# Patient Record
Sex: MALE | Race: WHITE | Employment: UNEMPLOYED | URBAN - METROPOLITAN AREA
[De-identification: names, ages, dates, MRNs, and addresses within clinical notes are randomized per-mention and may not be internally consistent; named-entity substitution may affect disease eponyms.]

---

## 2017-06-06 ENCOUNTER — TELEPHONE (OUTPATIENT)
Dept: PHARMACY | Facility: CLINIC | Age: 82
End: 2017-06-06

## 2018-10-10 ENCOUNTER — OFFICE VISIT (OUTPATIENT)
Dept: VASCULAR SURGERY | Age: 83
End: 2018-10-10
Payer: MEDICARE

## 2018-10-10 DIAGNOSIS — B35.9 DERMATOPHYTOSIS: ICD-10-CM

## 2018-10-10 DIAGNOSIS — I87.8 VENOUS STASIS OF BOTH LOWER EXTREMITIES: ICD-10-CM

## 2018-10-10 DIAGNOSIS — L97.921 ULCERS OF BOTH LOWER LEGS, LIMITED TO BREAKDOWN OF SKIN (HCC): ICD-10-CM

## 2018-10-10 DIAGNOSIS — I70.242 ATHEROSCLEROSIS OF NATIVE ARTERY OF BOTH LOWER EXTREMITIES WITH BILATERAL ULCERATION OF CALVES (HCC): Primary | ICD-10-CM

## 2018-10-10 DIAGNOSIS — L97.911 ULCERS OF BOTH LOWER LEGS, LIMITED TO BREAKDOWN OF SKIN (HCC): ICD-10-CM

## 2018-10-10 DIAGNOSIS — I70.232 ATHEROSCLEROSIS OF NATIVE ARTERY OF BOTH LOWER EXTREMITIES WITH BILATERAL ULCERATION OF CALVES (HCC): Primary | ICD-10-CM

## 2018-10-10 PROBLEM — I70.249 ATHEROSCLEROSIS OF NATIVE ARTERY OF BOTH LOWER EXTREMITIES WITH BILATERAL ULCERATION (HCC): Status: ACTIVE | Noted: 2018-10-10

## 2018-10-10 PROBLEM — I70.239 ATHEROSCLEROSIS OF NATIVE ARTERY OF BOTH LOWER EXTREMITIES WITH BILATERAL ULCERATION (HCC): Status: ACTIVE | Noted: 2018-10-10

## 2018-10-10 PROCEDURE — 99215 OFFICE O/P EST HI 40 MIN: CPT | Performed by: SURGERY

## 2018-10-10 RX ORDER — TERBINAFINE HYDROCHLORIDE 250 MG/1
250 TABLET ORAL DAILY
Qty: 15 TABLET | Refills: 0 | Status: SHIPPED | OUTPATIENT
Start: 2018-10-10 | End: 2018-10-22

## 2018-10-10 NOTE — PROGRESS NOTES
both lower legs, limited to breakdown of skin ([address]    3. Dermatophytosis    4. Venous stasis of both lower extremities              Plan:        I had a long and detailed discussion the patient, all options, risks benefits and alternatives were explained, patient was recommended lower extremity arterial Doppler study for documentation of vascular status, and medications as outlined below and benefits follow him and the wound care center at [facility]      1. Lower extremity arterial Doppler study     2. Topical Lotrimin cream to be applied twice a day from the toes, in between the toes up to the knee and then dressed the legs with Xeroform gauze on the open ulcerated area wrap the legs from the foot to the knee with Kerlix and apply single Tubigrip pending arterial Doppler study results    3. Lamisil 2 times daily for 15 days      Patient was instructed to continue walking program and to call if any worsening of symptoms and to call if any focal lateralizing neurological symptoms like loss of speech, vision or loss of function of extremity. All the questions were answered.           Indicated follow-up: Follow-up in the wound care center, [facility], and when necessary

## 2018-10-12 NOTE — TELEPHONE ENCOUNTER
----- Message from Laila Boone MD sent at 10/12/2018  2:10 PM EDT -----  Please notify [facility], do at least 3 times per week for now and call me if any worsening

## 2018-10-22 ENCOUNTER — HOSPITAL ENCOUNTER (OUTPATIENT)
Age: 83
Discharge: HOME | End: 2018-10-22
Payer: MEDICARE

## 2018-10-22 ENCOUNTER — HOSPITAL ENCOUNTER (OUTPATIENT)
Dept: INTERVENTIONAL RADIOLOGY/VASCULAR | Age: 83
Discharge: HOME | End: 2018-10-24
Payer: MEDICARE

## 2018-10-22 ENCOUNTER — HOSPITAL ENCOUNTER (OUTPATIENT)
Dept: WOUND CARE | Age: 83
Discharge: HOME | End: 2018-10-22
Payer: MEDICARE

## 2018-10-22 VITALS
BODY MASS INDEX: 28.77 KG/M2 | HEIGHT: 66 IN | SYSTOLIC BLOOD PRESSURE: 140 MMHG | DIASTOLIC BLOOD PRESSURE: 80 MMHG | TEMPERATURE: 98.1 F | RESPIRATION RATE: 18 BRPM | HEART RATE: 68 BPM | WEIGHT: 179 LBS

## 2018-10-22 DIAGNOSIS — I70.242 ATHEROSCLEROSIS OF NATIVE ARTERY OF BOTH LOWER EXTREMITIES WITH BILATERAL ULCERATION OF CALVES (HCC): ICD-10-CM

## 2018-10-22 DIAGNOSIS — L97.911 ULCERS OF BOTH LOWER LEGS, LIMITED TO BREAKDOWN OF SKIN (HCC): ICD-10-CM

## 2018-10-22 DIAGNOSIS — L97.922 BILATERAL LEG ULCER, WITH FAT LAYER EXPOSED (HCC): ICD-10-CM

## 2018-10-22 DIAGNOSIS — L97.921 ULCERS OF BOTH LOWER LEGS, LIMITED TO BREAKDOWN OF SKIN (HCC): ICD-10-CM

## 2018-10-22 DIAGNOSIS — I70.232 ATHEROSCLEROSIS OF NATIVE ARTERY OF BOTH LOWER EXTREMITIES WITH BILATERAL ULCERATION OF CALVES (HCC): ICD-10-CM

## 2018-10-22 DIAGNOSIS — B35.9 DERMATOPHYTOSIS: ICD-10-CM

## 2018-10-22 DIAGNOSIS — L97.912 BILATERAL LEG ULCER, WITH FAT LAYER EXPOSED (HCC): ICD-10-CM

## 2018-10-22 DIAGNOSIS — I70.242 ATHEROSCLEROSIS OF NATIVE ARTERY OF BOTH LOWER EXTREMITIES WITH BILATERAL ULCERATION OF CALVES (HCC): Primary | ICD-10-CM

## 2018-10-22 DIAGNOSIS — I70.232 ATHEROSCLEROSIS OF NATIVE ARTERY OF BOTH LOWER EXTREMITIES WITH BILATERAL ULCERATION OF CALVES (HCC): Primary | ICD-10-CM

## 2018-10-22 DIAGNOSIS — E11.622 DIABETES WITH ULCER OF LOWER EXTREMITY (HCC): ICD-10-CM

## 2018-10-22 DIAGNOSIS — L97.909 DIABETES WITH ULCER OF LOWER EXTREMITY (HCC): ICD-10-CM

## 2018-10-22 DIAGNOSIS — I87.8 VENOUS STASIS OF BOTH LOWER EXTREMITIES: ICD-10-CM

## 2018-10-22 LAB
ALBUMIN SERPL-MCNC: 3.9 G/DL (ref 3.5–5.2)
ALP BLD-CCNC: 105 U/L (ref 40–129)
ALT SERPL-CCNC: 12 U/L (ref 0–40)
ANION GAP SERPL CALCULATED.3IONS-SCNC: 14 MMOL/L (ref 7–16)
AST SERPL-CCNC: 14 U/L (ref 0–39)
BASOPHILS ABSOLUTE: 0 E9/L (ref 0–0.2)
BASOPHILS RELATIVE PERCENT: 0.4 % (ref 0–2)
BILIRUB SERPL-MCNC: 0.4 MG/DL (ref 0–1.2)
BUN BLDV-MCNC: 33 MG/DL (ref 8–23)
BURR CELLS: ABNORMAL
CALCIUM SERPL-MCNC: 9.9 MG/DL (ref 8.6–10.2)
CHLORIDE BLD-SCNC: 107 MMOL/L (ref 98–107)
CO2: 26 MMOL/L (ref 22–29)
CREAT SERPL-MCNC: 1.9 MG/DL (ref 0.7–1.2)
EOSINOPHILS ABSOLUTE: 0.34 E9/L (ref 0.05–0.5)
EOSINOPHILS RELATIVE PERCENT: 4.3 % (ref 0–6)
GFR AFRICAN AMERICAN: 40
GFR NON-AFRICAN AMERICAN: 33 ML/MIN/1.73
GLUCOSE BLD-MCNC: 110 MG/DL (ref 74–109)
HCT VFR BLD CALC: 40.8 % (ref 37–54)
HEMOGLOBIN: 12.7 G/DL (ref 12.5–16.5)
LYMPHOCYTES ABSOLUTE: 1.68 E9/L (ref 1.5–4)
LYMPHOCYTES RELATIVE PERCENT: 20.9 % (ref 20–42)
MCH RBC QN AUTO: 28.7 PG (ref 26–35)
MCHC RBC AUTO-ENTMCNC: 31.1 % (ref 32–34.5)
MCV RBC AUTO: 92.3 FL (ref 80–99.9)
MONOCYTES ABSOLUTE: 0.56 E9/L (ref 0.1–0.95)
MONOCYTES RELATIVE PERCENT: 7 % (ref 2–12)
NEUTROPHILS ABSOLUTE: 5.44 E9/L (ref 1.8–7.3)
NEUTROPHILS RELATIVE PERCENT: 67.8 % (ref 43–80)
OVALOCYTES: ABNORMAL
PDW BLD-RTO: 14.2 FL (ref 11.5–15)
PLATELET # BLD: 55 E9/L (ref 130–450)
PLATELET CONFIRMATION: NORMAL
PMV BLD AUTO: 14.2 FL (ref 7–12)
POIKILOCYTES: ABNORMAL
POTASSIUM SERPL-SCNC: 4.7 MMOL/L (ref 3.5–5)
RBC # BLD: 4.42 E12/L (ref 3.8–5.8)
SODIUM BLD-SCNC: 147 MMOL/L (ref 132–146)
TOTAL PROTEIN: 8 G/DL (ref 6.4–8.3)
WBC # BLD: 8 E9/L (ref 4.5–11.5)

## 2018-10-22 PROCEDURE — 11045 DBRDMT SUBQ TISS EACH ADDL: CPT

## 2018-10-22 PROCEDURE — 80053 COMPREHEN METABOLIC PANEL: CPT

## 2018-10-22 PROCEDURE — 99214 OFFICE O/P EST MOD 30 MIN: CPT

## 2018-10-22 PROCEDURE — 85025 COMPLETE CBC W/AUTO DIFF WBC: CPT

## 2018-10-22 PROCEDURE — 36415 COLL VENOUS BLD VENIPUNCTURE: CPT

## 2018-10-22 PROCEDURE — 11042 DBRDMT SUBQ TIS 1ST 20SQCM/<: CPT

## 2018-10-22 PROCEDURE — 93923 UPR/LXTR ART STDY 3+ LVLS: CPT

## 2018-10-22 RX ORDER — ACETAMINOPHEN 325 MG/1
650 TABLET ORAL EVERY 6 HOURS PRN
COMMUNITY

## 2018-10-22 RX ORDER — GUAIFENESIN AND DEXTROMETHORPHAN HYDROBROMIDE 100; 10 MG/5ML; MG/5ML
5 SOLUTION ORAL EVERY 6 HOURS PRN
COMMUNITY

## 2018-10-22 RX ORDER — TERBINAFINE HYDROCHLORIDE 250 MG/1
250 TABLET ORAL DAILY
Qty: 15 TABLET | Refills: 0 | Status: ON HOLD | OUTPATIENT
Start: 2018-10-22 | End: 2020-01-01

## 2018-10-22 RX ORDER — CILOSTAZOL 100 MG/1
100 TABLET ORAL 2 TIMES DAILY
COMMUNITY

## 2018-10-22 RX ORDER — LANOLIN ALCOHOL/MO/W.PET/CERES
1 CREAM (GRAM) TOPICAL PRN
Status: ON HOLD | COMMUNITY
End: 2020-01-01

## 2018-10-22 NOTE — PROGRESS NOTES
Wound Healing Center /Hyperbarics   History and Physical/Consultation  Vascular    Referring Physician : Joel Rogers MD  [facility] RECORD NUMBER:  [id number]  AGE: 80 y.o. GENDER: male  : 1927  EPISODE DATE:  10/22/2018  Subjective:     Chief Complaint   Patient presents with    Wound Check     patient here for treatment of bilateral lower leg and dorsal left foot ulcers         HISTORY of PRESENT ILLNESS HPI     Marika Smyth is a 80 y.o. male who presents today for wound/ulcer evaluation. History of Wound Context:  The patient has had a wound of Both lower extremities, below the knee including the feet and the casts which was first noted approximately at least April of this year. This has been treated with dressing changes by the home health care at the assisted living home. On their initial visit to the wound healing center, 10/22/18, the patient has noted that the wound has not been improving. The patient has had similar previous wounds in the past.      Pt is currently not on abx.       Wound/Ulcer Pain Timing/Severity: constant  Quality of pain: sharp, dull  Severity:  2 / 10   Modifying Factors: Pain worsens with walking  Associated Signs/Symptoms: edema    Ulcer Identification:  Ulcer Type: arterial and diabetic  Contributing Factors: edema    Diabetic/Pressure/Non Pressure Ulcers only:  Ulcer: Diabetic ulcer, fat layer exposed    If patient has diabetic lower extremity wounds  Bender Classification of diabetic lower extremity wounds:    Grade Description   []  0 No open wound   [x]  1 Superficial ulcer involving the full skin thickness   []  2 Deep ulcer involves ligament, tendon, joint capsule, or fascia  No bone involvement or abscess presence   []  3 Deep Ulcer with abcess formation and/or osteomyelitis   []  4 Localized gangrene   []  5 Extensive gangrene of the foot     Wound: Multiple superficial ulcers, with adipose tissue, aggravated by underlying diabetes, tablet Take 6.25 mg by mouth daily       LISINOPRIL PO Take 20 mg by mouth 2 times daily. No current facility-administered medications on file prior to encounter.         REVIEW OF SYSTEMS   ROS : All others Negative if blank [], Positive if [x]  General Urinary   [] Fevers [] Hematuria   [] Chills [] Dysuria   [] Weight Loss Vascular   Skin [x] Claudication   [x] Tissue Loss [] Rest Pain   Eyes Neurologic   [] Wears Glasses/Contacts [] Stroke/TIA   [] Vision Changes [] Focal weakness   Respiratory [] Slurred Speech    [] Shortness of breath ENT   Cardiovascular [] Difficulty swallowing   [] Chest Pain Gastrointestinal   [] Shortness of breath with exertion [] Abdominal Pain    [] Melena      Diabetes mellitus, 2                Objective:    BP (!) 140/80   Pulse 68   Temp 98.1 °F (36.7 °C) (Oral)   Resp 18   Ht 5' 6\" (1.676 m)   Wt 179 lb (81.2 kg)   BMI 28.89 kg/m²   Wt Readings from Last 3 Encounters:   10/22/18 179 lb (81.2 kg)   10/10/16 172 lb (78 kg)   12/02/12 163 lb 6.4 oz (74.1 kg)       PHYSICAL EXAM  CONSTITUTIONAL:   Awake, alert, cooperative   PSYCHIATRIC :  Oriented to time, place and person      normal insight to disease process  ENT:  External ears and nose without lesions    Hearing deficits is not noted  NECK: Supple, symmetrical, trachea midline    Thyroid goiter not appreciated    Carotid bruit is not noted bilaterally  LUNGS:  No increased work of breathing                  Clear to auscultation bilaterally   CARDIOVASCULAR:  regular rate and rhythm   ABDOMEN:  soft, non-distended, non-tender    Hernias is not noted   Aorta is not palpable   Lymphatics : Cervical lymphadenopathy is not noted     Femoral lymphadenopathy is not noted  SKIN:   Skin color is normal   Texture and turgor is  normal   Induration is not noted  EXTREMITIES:   R UE Edema is not noted  L UE Edema is not noted  R LE Edema is  noted  L LE Edema is  Noted    Multiple ulcers noted both legs, with adipose tissue WITH AQUAPHOR PRIOR TO EACH DRESSING CHANGE. LAMISIL TO BE TAKEN DAILY FOR 15 DAYS AS ORDERED    LOTRIMINNE TO BE APPLIED TO BOTH FEET AND IN BETWEEN TOES AND TO LEGS AROUND WOUNDS. TWICE DAILY FOR ONE MONTH.    CBC AND CMP ORDERED    CASE SUMAN Lipscomb    [address] [place] [address],[address] followup visit ___1 WEEK__________________________  (Please note your next appointment above and if you are unable to keep, kindly give a 24 hour notice. Thank you.)    Physician signature:__________________________      If you experience any of the following, please call the [facility] during business hours:    * Increase in Pain  * Temperature over 101  * Increase in drainage from your wound  * Drainage with a foul odor  * Bleeding  * Increase in swelling  * Need for compression bandage changes due to slippage, breakthrough drainage. If you need medical attention outside of the business hours of the [facility] please contact your PCP or go to the nearest emergency room.         Electronically signed by Manda Prakash MD on 10/22/2018 at 11:15 AM

## 2018-10-29 ENCOUNTER — HOSPITAL ENCOUNTER (OUTPATIENT)
Dept: WOUND CARE | Age: 83
Discharge: HOME | End: 2018-10-29
Payer: MEDICARE

## 2018-10-29 VITALS
TEMPERATURE: 98.5 F | RESPIRATION RATE: 18 BRPM | HEIGHT: 66 IN | SYSTOLIC BLOOD PRESSURE: 142 MMHG | HEART RATE: 72 BPM | WEIGHT: 179 LBS | DIASTOLIC BLOOD PRESSURE: 68 MMHG | BODY MASS INDEX: 28.77 KG/M2

## 2018-10-29 DIAGNOSIS — L97.921 NON-PRESSURE CHRONIC ULCER OF LEFT LOWER LEG, LIMITED TO BREAKDOWN OF SKIN (HCC): ICD-10-CM

## 2018-10-29 PROCEDURE — 97598 DBRDMT OPN WND ADDL 20CM/<: CPT | Performed by: SURGERY

## 2018-10-29 PROCEDURE — 11042 DBRDMT SUBQ TIS 1ST 20SQCM/<: CPT

## 2018-10-29 PROCEDURE — 11045 DBRDMT SUBQ TISS EACH ADDL: CPT

## 2018-10-29 PROCEDURE — 97597 DBRDMT OPN WND 1ST 20 CM/<: CPT | Performed by: SURGERY

## 2018-10-29 NOTE — PLAN OF CARE
Problem: Wound:  Goal: Will show signs of wound healing; wound closure and no evidence of infection  Will show signs of wound healing; wound closure and no evidence of infection   Outcome: Ongoing      Problem: Arterial:  Goal: Optimize blood flow for wound healing  Optimize blood flow for wound healing   Outcome: Ongoing      Problem: Venous:  Goal: Signs of wound healing will improve  Signs of wound healing will improve   Outcome: Ongoing      Problem: Falls - Risk of:  Goal: Will remain free from falls  Will remain free from falls   Outcome: Ongoing      Problem: Blood Glucose:  Goal: Ability to maintain appropriate glucose levels will improve  Ability to maintain appropriate glucose levels will improve   Outcome: Ongoing

## 2018-10-29 NOTE — PROGRESS NOTES
Wound Healing Center Followup Visit Note    Referring Physician : Marley Horvath MD  [facility] RECORD NUMBER:  [id number]  AGE: 80 y.o. GENDER: male  : 1927  EPISODE DATE:  10/29/2018    Subjective:       Chief Complaint   Patient presents with    Wound Check       patient here for treatment of bilateral lower leg and dorsal left foot ulcers         HISTORY of PRESENT ILLNESS HPI      Kimber Yo is a 80 y.o. male who presents today for wound/ulcer evaluation. History of Wound Context:  The patient has had a wound of Both lower extremities, below the knee including the feet and the casts which was first noted approximately at least April of this year. This has been treated with dressing changes by the home health care at the assisted living home. On their initial visit to the wound healing center, 10/22/18, the patient has noted that the wound has not been improving. The patient has had similar previous wounds in the past.       Pt is currently not on abx.       Wound/Ulcer Pain Timing/Severity: constant  Quality of pain: sharp, dull  Severity:  2 / 10   Modifying Factors: Pain worsens with walking  Associated Signs/Symptoms: edema     Ulcer Identification:  Ulcer Type: arterial and diabetic  Contributing Factors: edema     Diabetic/Pressure/Non Pressure Ulcers only:  Ulcer: Diabetic ulcer, fat layer exposed     If patient has diabetic lower extremity wounds  Bender Classification of diabetic lower extremity wounds:     Grade Description   []  0 No open wound   [x]  1 Superficial ulcer involving the full skin thickness   []  2 Deep ulcer involves ligament, tendon, joint capsule, or fascia  No bone involvement or abscess presence   []  3 Deep Ulcer with abcess formation and/or osteomyelitis   []  4 Localized gangrene   []  5 Extensive gangrene of the foot      Wound: Multiple superficial ulcers, with adipose tissue, aggravated by underlying diabetes, peripheral vascular disease,

## 2018-11-05 ENCOUNTER — HOSPITAL ENCOUNTER (OUTPATIENT)
Dept: WOUND CARE | Age: 83
Discharge: HOME | End: 2018-11-05
Payer: MEDICARE

## 2019-01-01 ENCOUNTER — TELEPHONE (OUTPATIENT)
Dept: WOUND CARE | Age: 84
End: 2019-01-01

## 2019-11-06 NOTE — PROGRESS NOTES
Refugio Garner from [facility] called to say that the patient refuses to come to wound care. She said that his legs are pretty bad and that the patient is ok with just home care taking care of it. I called the referring office, Dr Marci Castano office, but it was closed. I could not leave a message.

## 2020-01-01 ENCOUNTER — APPOINTMENT (OUTPATIENT)
Dept: GENERAL RADIOLOGY | Age: 85
DRG: 682 | End: 2020-01-01
Payer: MEDICARE

## 2020-01-01 ENCOUNTER — APPOINTMENT (OUTPATIENT)
Dept: CT IMAGING | Age: 85
DRG: 682 | End: 2020-01-01
Payer: MEDICARE

## 2020-01-01 ENCOUNTER — HOSPITAL ENCOUNTER (INPATIENT)
Age: 85
LOS: 7 days | Discharge: ANOTHER HOSPITAL | DRG: 682 | End: 2020-06-01
Attending: EMERGENCY MEDICINE | Admitting: INTERNAL MEDICINE
Payer: MEDICARE

## 2020-01-01 VITALS
WEIGHT: 148.5 LBS | RESPIRATION RATE: 18 BRPM | BODY MASS INDEX: 23.87 KG/M2 | HEART RATE: 78 BPM | OXYGEN SATURATION: 95 % | SYSTOLIC BLOOD PRESSURE: 156 MMHG | TEMPERATURE: 97.6 F | DIASTOLIC BLOOD PRESSURE: 60 MMHG | HEIGHT: 66 IN

## 2020-01-01 LAB
ACETAMINOPHEN LEVEL: <5 MCG/ML (ref 10–30)
ALBUMIN SERPL-MCNC: 2.3 G/DL (ref 3.5–5.2)
ALBUMIN SERPL-MCNC: 2.9 G/DL (ref 3.5–5.2)
ALBUMIN SERPL-MCNC: 3.5 G/DL (ref 3.5–5.2)
ALP BLD-CCNC: 69 U/L (ref 40–129)
ALP BLD-CCNC: 71 U/L (ref 40–129)
ALP BLD-CCNC: 85 U/L (ref 40–129)
ALT SERPL-CCNC: 17 U/L (ref 0–40)
ALT SERPL-CCNC: 8 U/L (ref 0–40)
ALT SERPL-CCNC: 9 U/L (ref 0–40)
AMMONIA: 21 UMOL/L (ref 16–60)
AMPHETAMINE SCREEN, URINE: NOT DETECTED
ANION GAP SERPL CALCULATED.3IONS-SCNC: 11 MMOL/L (ref 7–16)
ANION GAP SERPL CALCULATED.3IONS-SCNC: 11 MMOL/L (ref 7–16)
ANION GAP SERPL CALCULATED.3IONS-SCNC: 12 MMOL/L (ref 7–16)
ANION GAP SERPL CALCULATED.3IONS-SCNC: 17 MMOL/L (ref 7–16)
ANION GAP SERPL CALCULATED.3IONS-SCNC: 19 MMOL/L (ref 7–16)
ANION GAP SERPL CALCULATED.3IONS-SCNC: 20 MMOL/L (ref 7–16)
ANION GAP SERPL CALCULATED.3IONS-SCNC: 9 MMOL/L (ref 7–16)
AST SERPL-CCNC: 14 U/L (ref 0–39)
AST SERPL-CCNC: 17 U/L (ref 0–39)
AST SERPL-CCNC: 22 U/L (ref 0–39)
BACTERIA: ABNORMAL /HPF
BARBITURATE SCREEN URINE: NOT DETECTED
BASOPHILS ABSOLUTE: 0.02 E9/L (ref 0–0.2)
BASOPHILS RELATIVE PERCENT: 0.3 % (ref 0–2)
BENZODIAZEPINE SCREEN, URINE: NOT DETECTED
BILIRUB SERPL-MCNC: 0.3 MG/DL (ref 0–1.2)
BILIRUB SERPL-MCNC: 0.4 MG/DL (ref 0–1.2)
BILIRUB SERPL-MCNC: 0.4 MG/DL (ref 0–1.2)
BILIRUBIN URINE: NEGATIVE
BLOOD, URINE: ABNORMAL
BUN BLDV-MCNC: 26 MG/DL (ref 8–23)
BUN BLDV-MCNC: 26 MG/DL (ref 8–23)
BUN BLDV-MCNC: 28 MG/DL (ref 8–23)
BUN BLDV-MCNC: 28 MG/DL (ref 8–23)
BUN BLDV-MCNC: 30 MG/DL (ref 8–23)
BUN BLDV-MCNC: 30 MG/DL (ref 8–23)
BUN BLDV-MCNC: 36 MG/DL (ref 8–23)
BUN BLDV-MCNC: 40 MG/DL (ref 8–23)
BUN BLDV-MCNC: 46 MG/DL (ref 8–23)
C-REACTIVE PROTEIN: 16.3 MG/DL (ref 0–0.4)
CALCIUM SERPL-MCNC: 8.3 MG/DL (ref 8.6–10.2)
CALCIUM SERPL-MCNC: 8.5 MG/DL (ref 8.6–10.2)
CALCIUM SERPL-MCNC: 8.5 MG/DL (ref 8.6–10.2)
CALCIUM SERPL-MCNC: 8.6 MG/DL (ref 8.6–10.2)
CALCIUM SERPL-MCNC: 8.7 MG/DL (ref 8.6–10.2)
CALCIUM SERPL-MCNC: 8.7 MG/DL (ref 8.6–10.2)
CALCIUM SERPL-MCNC: 8.8 MG/DL (ref 8.6–10.2)
CALCIUM SERPL-MCNC: 9.1 MG/DL (ref 8.6–10.2)
CALCIUM SERPL-MCNC: 9.1 MG/DL (ref 8.6–10.2)
CANNABINOID SCREEN URINE: NOT DETECTED
CHLORIDE BLD-SCNC: 100 MMOL/L (ref 98–107)
CHLORIDE BLD-SCNC: 105 MMOL/L (ref 98–107)
CHLORIDE BLD-SCNC: 108 MMOL/L (ref 98–107)
CHLORIDE BLD-SCNC: 109 MMOL/L (ref 98–107)
CHLORIDE BLD-SCNC: 115 MMOL/L (ref 98–107)
CHLORIDE BLD-SCNC: 116 MMOL/L (ref 98–107)
CHLORIDE BLD-SCNC: 116 MMOL/L (ref 98–107)
CHLORIDE BLD-SCNC: 118 MMOL/L (ref 98–107)
CHLORIDE BLD-SCNC: 98 MMOL/L (ref 98–107)
CHP ED QC CHECK: NORMAL
CK MB: 1.7 NG/ML (ref 0–7.7)
CK MB: 2.2 NG/ML (ref 0–7.7)
CLARITY: CLEAR
CO2: 19 MMOL/L (ref 22–29)
CO2: 19 MMOL/L (ref 22–29)
CO2: 21 MMOL/L (ref 22–29)
CO2: 22 MMOL/L (ref 22–29)
CO2: 23 MMOL/L (ref 22–29)
CO2: 23 MMOL/L (ref 22–29)
CO2: 24 MMOL/L (ref 22–29)
CO2: 24 MMOL/L (ref 22–29)
CO2: 27 MMOL/L (ref 22–29)
COCAINE METABOLITE SCREEN URINE: NOT DETECTED
COLOR: YELLOW
CREAT SERPL-MCNC: 1.4 MG/DL (ref 0.7–1.2)
CREAT SERPL-MCNC: 1.7 MG/DL (ref 0.7–1.2)
CREAT SERPL-MCNC: 1.7 MG/DL (ref 0.7–1.2)
CREAT SERPL-MCNC: 1.8 MG/DL (ref 0.7–1.2)
CREAT SERPL-MCNC: 1.8 MG/DL (ref 0.7–1.2)
CREAT SERPL-MCNC: 1.9 MG/DL (ref 0.7–1.2)
CREAT SERPL-MCNC: 2.3 MG/DL (ref 0.7–1.2)
EKG ATRIAL RATE: 85 BPM
EKG ATRIAL RATE: 90 BPM
EKG P AXIS: 49 DEGREES
EKG P AXIS: 59 DEGREES
EKG P-R INTERVAL: 154 MS
EKG P-R INTERVAL: 170 MS
EKG Q-T INTERVAL: 358 MS
EKG Q-T INTERVAL: 382 MS
EKG QRS DURATION: 72 MS
EKG QRS DURATION: 72 MS
EKG QTC CALCULATION (BAZETT): 437 MS
EKG QTC CALCULATION (BAZETT): 454 MS
EKG R AXIS: 13 DEGREES
EKG R AXIS: 59 DEGREES
EKG T AXIS: 30 DEGREES
EKG T AXIS: 73 DEGREES
EKG VENTRICULAR RATE: 85 BPM
EKG VENTRICULAR RATE: 90 BPM
EOSINOPHILS ABSOLUTE: 0.12 E9/L (ref 0.05–0.5)
EOSINOPHILS RELATIVE PERCENT: 1.5 % (ref 0–6)
ETHANOL: <10 MG/DL (ref 0–0.08)
FENTANYL SCREEN, URINE: NOT DETECTED
GFR AFRICAN AMERICAN: 32
GFR AFRICAN AMERICAN: 40
GFR AFRICAN AMERICAN: 43
GFR AFRICAN AMERICAN: 43
GFR AFRICAN AMERICAN: 46
GFR AFRICAN AMERICAN: 46
GFR AFRICAN AMERICAN: 57
GFR NON-AFRICAN AMERICAN: 27 ML/MIN/1.73
GFR NON-AFRICAN AMERICAN: 33 ML/MIN/1.73
GFR NON-AFRICAN AMERICAN: 35 ML/MIN/1.73
GFR NON-AFRICAN AMERICAN: 35 ML/MIN/1.73
GFR NON-AFRICAN AMERICAN: 38 ML/MIN/1.73
GFR NON-AFRICAN AMERICAN: 38 ML/MIN/1.73
GFR NON-AFRICAN AMERICAN: 47 ML/MIN/1.73
GLUCOSE BLD-MCNC: 103 MG/DL (ref 74–99)
GLUCOSE BLD-MCNC: 117 MG/DL (ref 74–99)
GLUCOSE BLD-MCNC: 119 MG/DL (ref 74–99)
GLUCOSE BLD-MCNC: 121 MG/DL (ref 74–99)
GLUCOSE BLD-MCNC: 125 MG/DL (ref 74–99)
GLUCOSE BLD-MCNC: 128 MG/DL (ref 74–99)
GLUCOSE BLD-MCNC: 147 MG/DL (ref 74–99)
GLUCOSE BLD-MCNC: 151 MG/DL (ref 74–99)
GLUCOSE BLD-MCNC: 170 MG/DL (ref 74–99)
GLUCOSE BLD-MCNC: 93 MG/DL
GLUCOSE URINE: NEGATIVE MG/DL
HBA1C MFR BLD: 5.9 % (ref 4–5.6)
HCT VFR BLD CALC: 31.8 % (ref 37–54)
HCT VFR BLD CALC: 32.2 % (ref 37–54)
HCT VFR BLD CALC: 35.1 % (ref 37–54)
HEMOGLOBIN: 10 G/DL (ref 12.5–16.5)
HEMOGLOBIN: 10.4 G/DL (ref 12.5–16.5)
HEMOGLOBIN: 11 G/DL (ref 12.5–16.5)
IMMATURE GRANULOCYTES #: 0.04 E9/L
IMMATURE GRANULOCYTES %: 0.5 % (ref 0–5)
INR BLD: 1.1
KETONES, URINE: NEGATIVE MG/DL
LACTIC ACID, SEPSIS: 1.3 MMOL/L (ref 0.5–1.9)
LACTIC ACID: 0.8 MMOL/L (ref 0.5–2.2)
LACTIC ACID: 0.8 MMOL/L (ref 0.5–2.2)
LACTIC ACID: 0.9 MMOL/L (ref 0.5–2.2)
LACTIC ACID: 1.2 MMOL/L (ref 0.5–2.2)
LACTIC ACID: 1.3 MMOL/L (ref 0.5–2.2)
LACTIC ACID: 1.4 MMOL/L (ref 0.5–2.2)
LEUKOCYTE ESTERASE, URINE: NEGATIVE
LYMPHOCYTES ABSOLUTE: 1.74 E9/L (ref 1.5–4)
LYMPHOCYTES RELATIVE PERCENT: 22.3 % (ref 20–42)
Lab: NORMAL
MAGNESIUM: 1.3 MG/DL (ref 1.6–2.6)
MCH RBC QN AUTO: 28.6 PG (ref 26–35)
MCH RBC QN AUTO: 28.7 PG (ref 26–35)
MCH RBC QN AUTO: 29.1 PG (ref 26–35)
MCHC RBC AUTO-ENTMCNC: 31.3 % (ref 32–34.5)
MCHC RBC AUTO-ENTMCNC: 31.4 % (ref 32–34.5)
MCHC RBC AUTO-ENTMCNC: 32.3 % (ref 32–34.5)
MCV RBC AUTO: 90.2 FL (ref 80–99.9)
MCV RBC AUTO: 91.1 FL (ref 80–99.9)
MCV RBC AUTO: 91.2 FL (ref 80–99.9)
METER GLUCOSE: 104 MG/DL (ref 74–99)
METER GLUCOSE: 110 MG/DL (ref 74–99)
METER GLUCOSE: 110 MG/DL (ref 74–99)
METER GLUCOSE: 113 MG/DL (ref 74–99)
METER GLUCOSE: 117 MG/DL (ref 74–99)
METER GLUCOSE: 120 MG/DL (ref 74–99)
METER GLUCOSE: 120 MG/DL (ref 74–99)
METER GLUCOSE: 121 MG/DL (ref 74–99)
METER GLUCOSE: 123 MG/DL (ref 74–99)
METER GLUCOSE: 124 MG/DL (ref 74–99)
METER GLUCOSE: 125 MG/DL (ref 74–99)
METER GLUCOSE: 130 MG/DL (ref 74–99)
METER GLUCOSE: 130 MG/DL (ref 74–99)
METER GLUCOSE: 131 MG/DL (ref 74–99)
METER GLUCOSE: 133 MG/DL (ref 74–99)
METER GLUCOSE: 134 MG/DL (ref 74–99)
METER GLUCOSE: 137 MG/DL (ref 74–99)
METER GLUCOSE: 138 MG/DL (ref 74–99)
METER GLUCOSE: 139 MG/DL (ref 74–99)
METER GLUCOSE: 141 MG/DL (ref 74–99)
METER GLUCOSE: 142 MG/DL (ref 74–99)
METER GLUCOSE: 145 MG/DL (ref 74–99)
METER GLUCOSE: 148 MG/DL (ref 74–99)
METER GLUCOSE: 156 MG/DL (ref 74–99)
METER GLUCOSE: 160 MG/DL (ref 74–99)
METER GLUCOSE: 163 MG/DL (ref 74–99)
METER GLUCOSE: 167 MG/DL (ref 74–99)
METER GLUCOSE: 168 MG/DL (ref 74–99)
METER GLUCOSE: 349 MG/DL (ref 74–99)
METER GLUCOSE: 93 MG/DL (ref 74–99)
METER GLUCOSE: 96 MG/DL (ref 74–99)
METHADONE SCREEN, URINE: NOT DETECTED
MONOCYTES ABSOLUTE: 0.43 E9/L (ref 0.1–0.95)
MONOCYTES RELATIVE PERCENT: 5.5 % (ref 2–12)
NEUTROPHILS ABSOLUTE: 5.46 E9/L (ref 1.8–7.3)
NEUTROPHILS RELATIVE PERCENT: 69.9 % (ref 43–80)
NITRITE, URINE: NEGATIVE
OPIATE SCREEN URINE: NOT DETECTED
OXYCODONE URINE: NOT DETECTED
PDW BLD-RTO: 15.4 FL (ref 11.5–15)
PDW BLD-RTO: 15.7 FL (ref 11.5–15)
PDW BLD-RTO: 16 FL (ref 11.5–15)
PH UA: 7 (ref 5–9)
PHENCYCLIDINE SCREEN URINE: NOT DETECTED
PLATELET # BLD: 141 E9/L (ref 130–450)
PLATELET # BLD: 153 E9/L (ref 130–450)
PLATELET # BLD: 164 E9/L (ref 130–450)
PMV BLD AUTO: 13 FL (ref 7–12)
PMV BLD AUTO: 13.9 FL (ref 7–12)
PMV BLD AUTO: ABNORMAL FL (ref 7–12)
POTASSIUM SERPL-SCNC: 2.9 MMOL/L (ref 3.5–5)
POTASSIUM SERPL-SCNC: 3.1 MMOL/L (ref 3.5–5)
POTASSIUM SERPL-SCNC: 3.3 MMOL/L (ref 3.5–5)
POTASSIUM SERPL-SCNC: 3.8 MMOL/L (ref 3.5–5)
POTASSIUM SERPL-SCNC: 3.9 MMOL/L (ref 3.5–5)
POTASSIUM SERPL-SCNC: 3.9 MMOL/L (ref 3.5–5)
POTASSIUM SERPL-SCNC: 4.2 MMOL/L (ref 3.5–5)
POTASSIUM SERPL-SCNC: 4.3 MMOL/L (ref 3.5–5)
POTASSIUM SERPL-SCNC: 5.9 MMOL/L (ref 3.5–5)
PROTEIN UA: 100 MG/DL
PROTHROMBIN TIME: 12.7 SEC (ref 9.3–12.4)
RBC # BLD: 3.49 E12/L (ref 3.8–5.8)
RBC # BLD: 3.57 E12/L (ref 3.8–5.8)
RBC # BLD: 3.85 E12/L (ref 3.8–5.8)
RBC UA: ABNORMAL /HPF (ref 0–2)
REASON FOR REJECTION: NORMAL
REJECTED TEST: NORMAL
SALICYLATE, SERUM: <0.3 MG/DL (ref 0–30)
SARS-COV-2, NAAT: NOT DETECTED
SARS-COV-2, NAAT: NOT DETECTED
SEDIMENTATION RATE, ERYTHROCYTE: 90 MM/HR (ref 0–15)
SODIUM BLD-SCNC: 137 MMOL/L (ref 132–146)
SODIUM BLD-SCNC: 142 MMOL/L (ref 132–146)
SODIUM BLD-SCNC: 144 MMOL/L (ref 132–146)
SODIUM BLD-SCNC: 146 MMOL/L (ref 132–146)
SODIUM BLD-SCNC: 146 MMOL/L (ref 132–146)
SODIUM BLD-SCNC: 148 MMOL/L (ref 132–146)
SODIUM BLD-SCNC: 149 MMOL/L (ref 132–146)
SPECIFIC GRAVITY UA: 1.01 (ref 1–1.03)
TOTAL CK: 126 U/L (ref 20–200)
TOTAL CK: 78 U/L (ref 20–200)
TOTAL CK: 91 U/L (ref 20–200)
TOTAL PROTEIN: 6 G/DL (ref 6.4–8.3)
TOTAL PROTEIN: 6.4 G/DL (ref 6.4–8.3)
TOTAL PROTEIN: 7.5 G/DL (ref 6.4–8.3)
TRICYCLIC ANTIDEPRESSANTS SCREEN SERUM: NEGATIVE NG/ML
TROPONIN: 0.02 NG/ML (ref 0–0.03)
TROPONIN: 0.03 NG/ML (ref 0–0.03)
TROPONIN: 0.03 NG/ML (ref 0–0.03)
TROPONIN: 0.04 NG/ML (ref 0–0.03)
TSH SERPL DL<=0.05 MIU/L-ACNC: 2.03 UIU/ML (ref 0.27–4.2)
UROBILINOGEN, URINE: 0.2 E.U./DL
WBC # BLD: 11.4 E9/L (ref 4.5–11.5)
WBC # BLD: 7.8 E9/L (ref 4.5–11.5)
WBC # BLD: 9.5 E9/L (ref 4.5–11.5)
WBC UA: ABNORMAL /HPF (ref 0–5)

## 2020-01-01 PROCEDURE — 2060000000 HC ICU INTERMEDIATE R&B

## 2020-01-01 PROCEDURE — 82962 GLUCOSE BLOOD TEST: CPT

## 2020-01-01 PROCEDURE — 2500000003 HC RX 250 WO HCPCS: Performed by: INTERNAL MEDICINE

## 2020-01-01 PROCEDURE — 6370000000 HC RX 637 (ALT 250 FOR IP): Performed by: INTERNAL MEDICINE

## 2020-01-01 PROCEDURE — 2580000003 HC RX 258: Performed by: INTERNAL MEDICINE

## 2020-01-01 PROCEDURE — 6360000002 HC RX W HCPCS: Performed by: INTERNAL MEDICINE

## 2020-01-01 PROCEDURE — 6370000000 HC RX 637 (ALT 250 FOR IP): Performed by: HOSPITALIST

## 2020-01-01 PROCEDURE — 70450 CT HEAD/BRAIN W/O DYE: CPT

## 2020-01-01 PROCEDURE — 6360000002 HC RX W HCPCS: Performed by: FAMILY MEDICINE

## 2020-01-01 PROCEDURE — 71045 X-RAY EXAM CHEST 1 VIEW: CPT

## 2020-01-01 PROCEDURE — 36415 COLL VENOUS BLD VENIPUNCTURE: CPT

## 2020-01-01 PROCEDURE — 83605 ASSAY OF LACTIC ACID: CPT

## 2020-01-01 PROCEDURE — 93005 ELECTROCARDIOGRAM TRACING: CPT | Performed by: FAMILY MEDICINE

## 2020-01-01 PROCEDURE — 74018 RADEX ABDOMEN 1 VIEW: CPT

## 2020-01-01 PROCEDURE — 84443 ASSAY THYROID STIM HORMONE: CPT

## 2020-01-01 PROCEDURE — 85027 COMPLETE CBC AUTOMATED: CPT

## 2020-01-01 PROCEDURE — G0480 DRUG TEST DEF 1-7 CLASSES: HCPCS

## 2020-01-01 PROCEDURE — 93005 ELECTROCARDIOGRAM TRACING: CPT | Performed by: EMERGENCY MEDICINE

## 2020-01-01 PROCEDURE — 80048 BASIC METABOLIC PNL TOTAL CA: CPT

## 2020-01-01 PROCEDURE — 83735 ASSAY OF MAGNESIUM: CPT

## 2020-01-01 PROCEDURE — 81001 URINALYSIS AUTO W/SCOPE: CPT

## 2020-01-01 PROCEDURE — 97535 SELF CARE MNGMENT TRAINING: CPT

## 2020-01-01 PROCEDURE — U0002 COVID-19 LAB TEST NON-CDC: HCPCS

## 2020-01-01 PROCEDURE — 93010 ELECTROCARDIOGRAM REPORT: CPT | Performed by: INTERNAL MEDICINE

## 2020-01-01 PROCEDURE — 85025 COMPLETE CBC W/AUTO DIFF WBC: CPT

## 2020-01-01 PROCEDURE — 85610 PROTHROMBIN TIME: CPT

## 2020-01-01 PROCEDURE — APPSS60 APP SPLIT SHARED TIME 46-60 MINUTES: Performed by: NURSE PRACTITIONER

## 2020-01-01 PROCEDURE — 97530 THERAPEUTIC ACTIVITIES: CPT

## 2020-01-01 PROCEDURE — 2500000003 HC RX 250 WO HCPCS: Performed by: PHYSICIAN ASSISTANT

## 2020-01-01 PROCEDURE — 1200000000 HC SEMI PRIVATE

## 2020-01-01 PROCEDURE — 80307 DRUG TEST PRSMV CHEM ANLYZR: CPT

## 2020-01-01 PROCEDURE — 72170 X-RAY EXAM OF PELVIS: CPT

## 2020-01-01 PROCEDURE — 85651 RBC SED RATE NONAUTOMATED: CPT

## 2020-01-01 PROCEDURE — 2500000003 HC RX 250 WO HCPCS: Performed by: SURGERY

## 2020-01-01 PROCEDURE — 84484 ASSAY OF TROPONIN QUANT: CPT

## 2020-01-01 PROCEDURE — 83036 HEMOGLOBIN GLYCOSYLATED A1C: CPT

## 2020-01-01 PROCEDURE — 97165 OT EVAL LOW COMPLEX 30 MIN: CPT

## 2020-01-01 PROCEDURE — 2500000003 HC RX 250 WO HCPCS: Performed by: FAMILY MEDICINE

## 2020-01-01 PROCEDURE — 74240 X-RAY XM UPR GI TRC 1CNTRST: CPT

## 2020-01-01 PROCEDURE — 72125 CT NECK SPINE W/O DYE: CPT

## 2020-01-01 PROCEDURE — 6360000002 HC RX W HCPCS: Performed by: NURSE PRACTITIONER

## 2020-01-01 PROCEDURE — 99221 1ST HOSP IP/OBS SF/LOW 40: CPT | Performed by: PSYCHIATRY & NEUROLOGY

## 2020-01-01 PROCEDURE — 80053 COMPREHEN METABOLIC PANEL: CPT

## 2020-01-01 PROCEDURE — 99285 EMERGENCY DEPT VISIT HI MDM: CPT

## 2020-01-01 PROCEDURE — 94761 N-INVAS EAR/PLS OXIMETRY MLT: CPT

## 2020-01-01 PROCEDURE — 82550 ASSAY OF CK (CPK): CPT

## 2020-01-01 PROCEDURE — G0378 HOSPITAL OBSERVATION PER HR: HCPCS

## 2020-01-01 PROCEDURE — 82553 CREATINE MB FRACTION: CPT

## 2020-01-01 PROCEDURE — 82140 ASSAY OF AMMONIA: CPT

## 2020-01-01 PROCEDURE — 97530 THERAPEUTIC ACTIVITIES: CPT | Performed by: PHYSICAL THERAPIST

## 2020-01-01 PROCEDURE — 6360000002 HC RX W HCPCS: Performed by: HOSPITALIST

## 2020-01-01 PROCEDURE — 86140 C-REACTIVE PROTEIN: CPT

## 2020-01-01 PROCEDURE — 99223 1ST HOSP IP/OBS HIGH 75: CPT | Performed by: INTERNAL MEDICINE

## 2020-01-01 PROCEDURE — 97161 PT EVAL LOW COMPLEX 20 MIN: CPT | Performed by: PHYSICAL THERAPIST

## 2020-01-01 RX ORDER — PANTOPRAZOLE SODIUM 40 MG/1
40 TABLET, DELAYED RELEASE ORAL DAILY
Status: DISCONTINUED | OUTPATIENT
Start: 2020-01-01 | End: 2020-01-01 | Stop reason: HOSPADM

## 2020-01-01 RX ORDER — MIRTAZAPINE 15 MG/1
15 TABLET, FILM COATED ORAL NIGHTLY
Status: DISCONTINUED | OUTPATIENT
Start: 2020-01-01 | End: 2020-01-01 | Stop reason: HOSPADM

## 2020-01-01 RX ORDER — ONDANSETRON 2 MG/ML
4 INJECTION INTRAMUSCULAR; INTRAVENOUS EVERY 6 HOURS PRN
Status: DISCONTINUED | OUTPATIENT
Start: 2020-01-01 | End: 2020-01-01 | Stop reason: HOSPADM

## 2020-01-01 RX ORDER — HYDRALAZINE HYDROCHLORIDE 20 MG/ML
10 INJECTION INTRAMUSCULAR; INTRAVENOUS EVERY 4 HOURS PRN
Status: DISCONTINUED | OUTPATIENT
Start: 2020-01-01 | End: 2020-01-01 | Stop reason: HOSPADM

## 2020-01-01 RX ORDER — M-VIT,TX,IRON,MINS/CALC/FOLIC 27MG-0.4MG
1 TABLET ORAL DAILY
Status: DISCONTINUED | OUTPATIENT
Start: 2020-01-01 | End: 2020-01-01

## 2020-01-01 RX ORDER — NICOTINE POLACRILEX 4 MG
15 LOZENGE BUCCAL PRN
Status: DISCONTINUED | OUTPATIENT
Start: 2020-01-01 | End: 2020-01-01 | Stop reason: HOSPADM

## 2020-01-01 RX ORDER — HEPARIN SODIUM 10000 [USP'U]/ML
5000 INJECTION, SOLUTION INTRAVENOUS; SUBCUTANEOUS EVERY 8 HOURS
Status: DISCONTINUED | OUTPATIENT
Start: 2020-01-01 | End: 2020-01-01 | Stop reason: HOSPADM

## 2020-01-01 RX ORDER — LORAZEPAM 2 MG/ML
0.5 INJECTION INTRAMUSCULAR ONCE
Status: DISCONTINUED | OUTPATIENT
Start: 2020-01-01 | End: 2020-01-01 | Stop reason: HOSPADM

## 2020-01-01 RX ORDER — AMLODIPINE BESYLATE 10 MG/1
10 TABLET ORAL DAILY
Qty: 30 TABLET | Refills: 3
Start: 2020-01-01

## 2020-01-01 RX ORDER — CILOSTAZOL 100 MG/1
100 TABLET ORAL 2 TIMES DAILY
Status: DISCONTINUED | OUTPATIENT
Start: 2020-01-01 | End: 2020-01-01 | Stop reason: HOSPADM

## 2020-01-01 RX ORDER — METOCLOPRAMIDE HYDROCHLORIDE 5 MG/ML
5 INJECTION INTRAMUSCULAR; INTRAVENOUS EVERY 6 HOURS
Status: DISCONTINUED | OUTPATIENT
Start: 2020-01-01 | End: 2020-01-01

## 2020-01-01 RX ORDER — METOPROLOL TARTRATE 5 MG/5ML
2.5 INJECTION INTRAVENOUS ONCE
Status: COMPLETED | OUTPATIENT
Start: 2020-01-01 | End: 2020-01-01

## 2020-01-01 RX ORDER — HYDRALAZINE HYDROCHLORIDE 20 MG/ML
5 INJECTION INTRAMUSCULAR; INTRAVENOUS EVERY 6 HOURS PRN
Status: DISCONTINUED | OUTPATIENT
Start: 2020-01-01 | End: 2020-01-01

## 2020-01-01 RX ORDER — PRAVASTATIN SODIUM 10 MG
10 TABLET ORAL DAILY
Status: DISCONTINUED | OUTPATIENT
Start: 2020-01-01 | End: 2020-01-01 | Stop reason: HOSPADM

## 2020-01-01 RX ORDER — POTASSIUM CHLORIDE, DEXTROSE MONOHYDRATE AND SODIUM CHLORIDE 300; 5; 900 MG/100ML; G/100ML; MG/100ML
INJECTION, SOLUTION INTRAVENOUS CONTINUOUS
Status: DISCONTINUED | OUTPATIENT
Start: 2020-01-01 | End: 2020-01-01 | Stop reason: SDUPTHER

## 2020-01-01 RX ORDER — DEXTROSE, SODIUM CHLORIDE, AND POTASSIUM CHLORIDE 5; .45; .3 G/100ML; G/100ML; G/100ML
INJECTION INTRAVENOUS CONTINUOUS
Status: DISCONTINUED | OUTPATIENT
Start: 2020-01-01 | End: 2020-01-01 | Stop reason: HOSPADM

## 2020-01-01 RX ORDER — DEXTROSE MONOHYDRATE 50 MG/ML
100 INJECTION, SOLUTION INTRAVENOUS PRN
Status: DISCONTINUED | OUTPATIENT
Start: 2020-01-01 | End: 2020-01-01 | Stop reason: HOSPADM

## 2020-01-01 RX ORDER — DOCUSATE SODIUM 100 MG/1
100 CAPSULE, LIQUID FILLED ORAL NIGHTLY
Status: DISCONTINUED | OUTPATIENT
Start: 2020-01-01 | End: 2020-01-01 | Stop reason: HOSPADM

## 2020-01-01 RX ORDER — HYDRALAZINE HYDROCHLORIDE 20 MG/ML
20 INJECTION INTRAMUSCULAR; INTRAVENOUS ONCE
Status: COMPLETED | OUTPATIENT
Start: 2020-01-01 | End: 2020-01-01

## 2020-01-01 RX ORDER — DEXTROSE MONOHYDRATE 25 G/50ML
12.5 INJECTION, SOLUTION INTRAVENOUS PRN
Status: DISCONTINUED | OUTPATIENT
Start: 2020-01-01 | End: 2020-01-01 | Stop reason: HOSPADM

## 2020-01-01 RX ORDER — DEXTROSE MONOHYDRATE 50 MG/ML
INJECTION, SOLUTION INTRAVENOUS CONTINUOUS
Status: DISCONTINUED | OUTPATIENT
Start: 2020-01-01 | End: 2020-01-01

## 2020-01-01 RX ORDER — ACETAMINOPHEN 325 MG/1
650 TABLET ORAL EVERY 4 HOURS PRN
Status: DISCONTINUED | OUTPATIENT
Start: 2020-01-01 | End: 2020-01-01 | Stop reason: HOSPADM

## 2020-01-01 RX ORDER — POTASSIUM CHLORIDE 7.45 MG/ML
10 INJECTION INTRAVENOUS
Status: COMPLETED | OUTPATIENT
Start: 2020-01-01 | End: 2020-01-01

## 2020-01-01 RX ORDER — AMLODIPINE BESYLATE 10 MG/1
10 TABLET ORAL DAILY
Status: DISCONTINUED | OUTPATIENT
Start: 2020-01-01 | End: 2020-01-01 | Stop reason: HOSPADM

## 2020-01-01 RX ORDER — METOPROLOL TARTRATE 5 MG/5ML
2.5 INJECTION INTRAVENOUS EVERY 6 HOURS
Status: DISCONTINUED | OUTPATIENT
Start: 2020-01-01 | End: 2020-01-01

## 2020-01-01 RX ORDER — AMLODIPINE BESYLATE 5 MG/1
5 TABLET ORAL DAILY
Status: DISCONTINUED | OUTPATIENT
Start: 2020-01-01 | End: 2020-01-01

## 2020-01-01 RX ORDER — CARVEDILOL 6.25 MG/1
6.25 TABLET ORAL DAILY
Status: DISCONTINUED | OUTPATIENT
Start: 2020-01-01 | End: 2020-01-01 | Stop reason: HOSPADM

## 2020-01-01 RX ORDER — PETROLATUM 42 G/100G
OINTMENT TOPICAL 3 TIMES DAILY PRN
Status: DISCONTINUED | OUTPATIENT
Start: 2020-01-01 | End: 2020-01-01 | Stop reason: HOSPADM

## 2020-01-01 RX ORDER — PETROLATUM 42 G/100G
OINTMENT TOPICAL PRN
Status: DISCONTINUED | OUTPATIENT
Start: 2020-01-01 | End: 2020-01-01

## 2020-01-01 RX ADMIN — ONDANSETRON 4 MG: 2 INJECTION INTRAMUSCULAR; INTRAVENOUS at 12:25

## 2020-01-01 RX ADMIN — POTASSIUM BICARBONATE 40 MEQ: 782 TABLET, EFFERVESCENT ORAL at 21:04

## 2020-01-01 RX ADMIN — METOPROLOL TARTRATE 2.5 MG: 5 INJECTION INTRAVENOUS at 17:36

## 2020-01-01 RX ADMIN — CILOSTAZOL 100 MG: 100 TABLET ORAL at 22:50

## 2020-01-01 RX ADMIN — Medication: at 21:00

## 2020-01-01 RX ADMIN — POTASSIUM CHLORIDE 10 MEQ: 7.46 INJECTION, SOLUTION INTRAVENOUS at 13:25

## 2020-01-01 RX ADMIN — HEPARIN SODIUM 5000 UNITS: 10000 INJECTION INTRAVENOUS; SUBCUTANEOUS at 22:42

## 2020-01-01 RX ADMIN — HYDRALAZINE HYDROCHLORIDE 5 MG: 20 INJECTION INTRAMUSCULAR; INTRAVENOUS at 15:18

## 2020-01-01 RX ADMIN — HEPARIN SODIUM 5000 UNITS: 10000 INJECTION INTRAVENOUS; SUBCUTANEOUS at 14:50

## 2020-01-01 RX ADMIN — HEPARIN SODIUM 5000 UNITS: 10000 INJECTION INTRAVENOUS; SUBCUTANEOUS at 21:03

## 2020-01-01 RX ADMIN — BARIUM SULFATE 176 G: 960 POWDER, FOR SUSPENSION ORAL at 14:30

## 2020-01-01 RX ADMIN — METOCLOPRAMIDE 5 MG: 5 INJECTION, SOLUTION INTRAMUSCULAR; INTRAVENOUS at 08:17

## 2020-01-01 RX ADMIN — Medication: at 21:58

## 2020-01-01 RX ADMIN — PRAVASTATIN SODIUM 10 MG: 10 TABLET ORAL at 09:44

## 2020-01-01 RX ADMIN — DOCUSATE SODIUM 100 MG: 100 CAPSULE, LIQUID FILLED ORAL at 21:07

## 2020-01-01 RX ADMIN — Medication: at 21:47

## 2020-01-01 RX ADMIN — ONDANSETRON 4 MG: 2 INJECTION INTRAMUSCULAR; INTRAVENOUS at 06:29

## 2020-01-01 RX ADMIN — CILOSTAZOL 100 MG: 100 TABLET ORAL at 08:28

## 2020-01-01 RX ADMIN — HEPARIN SODIUM 5000 UNITS: 10000 INJECTION INTRAVENOUS; SUBCUTANEOUS at 13:35

## 2020-01-01 RX ADMIN — DOCUSATE SODIUM 100 MG: 100 CAPSULE, LIQUID FILLED ORAL at 21:00

## 2020-01-01 RX ADMIN — HEPARIN SODIUM 5000 UNITS: 10000 INJECTION INTRAVENOUS; SUBCUTANEOUS at 17:36

## 2020-01-01 RX ADMIN — METOPROLOL TARTRATE 2.5 MG: 5 INJECTION INTRAVENOUS at 21:54

## 2020-01-01 RX ADMIN — Medication: at 09:32

## 2020-01-01 RX ADMIN — CILOSTAZOL 100 MG: 100 TABLET ORAL at 21:07

## 2020-01-01 RX ADMIN — DEXTROSE MONOHYDRATE: 50 INJECTION, SOLUTION INTRAVENOUS at 18:03

## 2020-01-01 RX ADMIN — HYDRALAZINE HYDROCHLORIDE 10 MG: 20 INJECTION INTRAMUSCULAR; INTRAVENOUS at 10:06

## 2020-01-01 RX ADMIN — SKIN PROTECTANT: 44 OINTMENT TOPICAL at 09:32

## 2020-01-01 RX ADMIN — ONDANSETRON 4 MG: 2 INJECTION INTRAMUSCULAR; INTRAVENOUS at 00:05

## 2020-01-01 RX ADMIN — HEPARIN SODIUM: 10000 INJECTION INTRAVENOUS; SUBCUTANEOUS at 06:05

## 2020-01-01 RX ADMIN — DEXTROSE MONOHYDRATE: 50 INJECTION, SOLUTION INTRAVENOUS at 22:06

## 2020-01-01 RX ADMIN — ONDANSETRON 4 MG: 2 INJECTION INTRAMUSCULAR; INTRAVENOUS at 17:13

## 2020-01-01 RX ADMIN — HEPARIN SODIUM 5000 UNITS: 10000 INJECTION INTRAVENOUS; SUBCUTANEOUS at 05:36

## 2020-01-01 RX ADMIN — HEPARIN SODIUM 5000 UNITS: 10000 INJECTION INTRAVENOUS; SUBCUTANEOUS at 21:09

## 2020-01-01 RX ADMIN — MIRTAZAPINE 15 MG: 15 TABLET, FILM COATED ORAL at 21:00

## 2020-01-01 RX ADMIN — METOPROLOL TARTRATE 2.5 MG: 5 INJECTION INTRAVENOUS at 16:52

## 2020-01-01 RX ADMIN — DEXTROSE MONOHYDRATE, SODIUM CHLORIDE, AND POTASSIUM CHLORIDE: 50; 9; 2.98 INJECTION, SOLUTION INTRAVENOUS at 09:27

## 2020-01-01 RX ADMIN — DEXTROSE MONOHYDRATE, SODIUM CHLORIDE, AND POTASSIUM CHLORIDE: 50; 4.5; 2.98 INJECTION, SOLUTION INTRAVENOUS at 11:23

## 2020-01-01 RX ADMIN — DEXTROSE MONOHYDRATE, SODIUM CHLORIDE, AND POTASSIUM CHLORIDE: 50; 4.5; 2.98 INJECTION, SOLUTION INTRAVENOUS at 10:07

## 2020-01-01 RX ADMIN — Medication: at 08:33

## 2020-01-01 RX ADMIN — HYDRALAZINE HYDROCHLORIDE 10 MG: 20 INJECTION INTRAMUSCULAR; INTRAVENOUS at 14:58

## 2020-01-01 RX ADMIN — DEXTROSE MONOHYDRATE: 50 INJECTION, SOLUTION INTRAVENOUS at 11:37

## 2020-01-01 RX ADMIN — POTASSIUM CHLORIDE 10 MEQ: 7.46 INJECTION, SOLUTION INTRAVENOUS at 10:31

## 2020-01-01 RX ADMIN — POTASSIUM CHLORIDE 10 MEQ: 7.46 INJECTION, SOLUTION INTRAVENOUS at 14:17

## 2020-01-01 RX ADMIN — DEXTROSE MONOHYDRATE: 50 INJECTION, SOLUTION INTRAVENOUS at 07:55

## 2020-01-01 RX ADMIN — HEPARIN SODIUM 5000 UNITS: 10000 INJECTION INTRAVENOUS; SUBCUTANEOUS at 21:51

## 2020-01-01 RX ADMIN — CARVEDILOL 6.25 MG: 6.25 TABLET, FILM COATED ORAL at 09:44

## 2020-01-01 RX ADMIN — PRAVASTATIN SODIUM 10 MG: 10 TABLET ORAL at 08:28

## 2020-01-01 RX ADMIN — MIRTAZAPINE 15 MG: 15 TABLET, FILM COATED ORAL at 21:07

## 2020-01-01 RX ADMIN — HEPARIN SODIUM 5000 UNITS: 10000 INJECTION INTRAVENOUS; SUBCUTANEOUS at 12:36

## 2020-01-01 RX ADMIN — DOCUSATE SODIUM 100 MG: 100 CAPSULE, LIQUID FILLED ORAL at 21:04

## 2020-01-01 RX ADMIN — INSULIN LISPRO 2 UNITS: 100 INJECTION, SOLUTION INTRAVENOUS; SUBCUTANEOUS at 17:39

## 2020-01-01 RX ADMIN — CILOSTAZOL 100 MG: 100 TABLET ORAL at 09:44

## 2020-01-01 RX ADMIN — HEPARIN SODIUM 5000 UNITS: 10000 INJECTION INTRAVENOUS; SUBCUTANEOUS at 06:01

## 2020-01-01 RX ADMIN — DEXTROSE MONOHYDRATE, SODIUM CHLORIDE, AND POTASSIUM CHLORIDE: 50; 9; 2.98 INJECTION, SOLUTION INTRAVENOUS at 03:25

## 2020-01-01 RX ADMIN — HEPARIN SODIUM 5000 UNITS: 10000 INJECTION INTRAVENOUS; SUBCUTANEOUS at 15:40

## 2020-01-01 RX ADMIN — METOPROLOL TARTRATE 2.5 MG: 5 INJECTION INTRAVENOUS at 09:30

## 2020-01-01 RX ADMIN — POTASSIUM CHLORIDE 10 MEQ: 7.46 INJECTION, SOLUTION INTRAVENOUS at 09:27

## 2020-01-01 RX ADMIN — HEPARIN SODIUM 5000 UNITS: 10000 INJECTION INTRAVENOUS; SUBCUTANEOUS at 06:30

## 2020-01-01 RX ADMIN — METOPROLOL TARTRATE 2.5 MG: 5 INJECTION INTRAVENOUS at 03:37

## 2020-01-01 RX ADMIN — MULTIPLE VITAMINS W/ MINERALS TAB 1 TABLET: TAB at 09:44

## 2020-01-01 RX ADMIN — POTASSIUM CHLORIDE 10 MEQ: 7.46 INJECTION, SOLUTION INTRAVENOUS at 08:11

## 2020-01-01 RX ADMIN — PANTOPRAZOLE SODIUM 40 MG: 40 TABLET, DELAYED RELEASE ORAL at 06:25

## 2020-01-01 RX ADMIN — HYDRALAZINE HYDROCHLORIDE 10 MG: 20 INJECTION INTRAMUSCULAR; INTRAVENOUS at 20:34

## 2020-01-01 RX ADMIN — ACETAMINOPHEN 650 MG: 325 TABLET, FILM COATED ORAL at 05:01

## 2020-01-01 RX ADMIN — METOPROLOL TARTRATE 2.5 MG: 5 INJECTION INTRAVENOUS at 03:25

## 2020-01-01 RX ADMIN — DEXTROSE MONOHYDRATE, SODIUM CHLORIDE, AND POTASSIUM CHLORIDE: 50; 9; 2.98 INJECTION, SOLUTION INTRAVENOUS at 13:07

## 2020-01-01 RX ADMIN — Medication: at 09:29

## 2020-01-01 RX ADMIN — HYDRALAZINE HYDROCHLORIDE 5 MG: 20 INJECTION INTRAMUSCULAR; INTRAVENOUS at 08:11

## 2020-01-01 RX ADMIN — HEPARIN SODIUM 5000 UNITS: 10000 INJECTION INTRAVENOUS; SUBCUTANEOUS at 05:38

## 2020-01-01 RX ADMIN — METOPROLOL TARTRATE 2.5 MG: 5 INJECTION INTRAVENOUS at 21:34

## 2020-01-01 RX ADMIN — MIRTAZAPINE 15 MG: 15 TABLET, FILM COATED ORAL at 22:51

## 2020-01-01 RX ADMIN — CILOSTAZOL 100 MG: 100 TABLET ORAL at 21:00

## 2020-01-01 RX ADMIN — AMLODIPINE BESYLATE 5 MG: 5 TABLET ORAL at 16:36

## 2020-01-01 RX ADMIN — DEXTROSE MONOHYDRATE, SODIUM CHLORIDE, AND POTASSIUM CHLORIDE: 50; 9; 2.98 INJECTION, SOLUTION INTRAVENOUS at 00:31

## 2020-01-01 RX ADMIN — PANTOPRAZOLE SODIUM 40 MG: 40 TABLET, DELAYED RELEASE ORAL at 05:39

## 2020-01-01 RX ADMIN — POTASSIUM BICARBONATE 40 MEQ: 782 TABLET, EFFERVESCENT ORAL at 12:31

## 2020-01-01 RX ADMIN — HYDRALAZINE HYDROCHLORIDE 10 MG: 20 INJECTION INTRAMUSCULAR; INTRAVENOUS at 14:44

## 2020-01-01 RX ADMIN — HEPARIN SODIUM 5000 UNITS: 10000 INJECTION INTRAVENOUS; SUBCUTANEOUS at 14:04

## 2020-01-01 RX ADMIN — HEPARIN SODIUM 5000 UNITS: 10000 INJECTION INTRAVENOUS; SUBCUTANEOUS at 21:55

## 2020-01-01 RX ADMIN — ONDANSETRON 4 MG: 2 INJECTION INTRAMUSCULAR; INTRAVENOUS at 18:00

## 2020-01-01 RX ADMIN — HEPARIN SODIUM 5000 UNITS: 10000 INJECTION INTRAVENOUS; SUBCUTANEOUS at 14:44

## 2020-01-01 RX ADMIN — CARVEDILOL 6.25 MG: 6.25 TABLET, FILM COATED ORAL at 09:20

## 2020-01-01 RX ADMIN — ACETAMINOPHEN 650 MG: 325 TABLET, FILM COATED ORAL at 20:59

## 2020-01-01 RX ADMIN — Medication: at 10:31

## 2020-01-01 RX ADMIN — HEPARIN SODIUM 5000 UNITS: 10000 INJECTION INTRAVENOUS; SUBCUTANEOUS at 22:53

## 2020-01-01 RX ADMIN — AMLODIPINE BESYLATE 10 MG: 10 TABLET ORAL at 08:28

## 2020-01-01 RX ADMIN — HEPARIN SODIUM 5000 UNITS: 10000 INJECTION INTRAVENOUS; SUBCUTANEOUS at 05:45

## 2020-01-01 RX ADMIN — HEPARIN SODIUM 5000 UNITS: 10000 INJECTION INTRAVENOUS; SUBCUTANEOUS at 21:34

## 2020-01-01 RX ADMIN — DEXTROSE MONOHYDRATE, SODIUM CHLORIDE, AND POTASSIUM CHLORIDE: 50; 9; 2.98 INJECTION, SOLUTION INTRAVENOUS at 21:48

## 2020-01-01 RX ADMIN — HEPARIN SODIUM 5000 UNITS: 10000 INJECTION INTRAVENOUS; SUBCUTANEOUS at 06:25

## 2020-01-01 RX ADMIN — HYDRALAZINE HYDROCHLORIDE 20 MG: 20 INJECTION INTRAMUSCULAR; INTRAVENOUS at 14:08

## 2020-01-01 RX ADMIN — HYDRALAZINE HYDROCHLORIDE 10 MG: 20 INJECTION INTRAMUSCULAR; INTRAVENOUS at 09:32

## 2020-01-01 RX ADMIN — PANTOPRAZOLE SODIUM 40 MG: 40 TABLET, DELAYED RELEASE ORAL at 06:01

## 2020-01-01 ASSESSMENT — PAIN SCALES - GENERAL
PAINLEVEL_OUTOF10: 0
PAINLEVEL_OUTOF10: 3
PAINLEVEL_OUTOF10: 0
PAINLEVEL_OUTOF10: 0
PAINLEVEL_OUTOF10: 5
PAINLEVEL_OUTOF10: 0
PAINLEVEL_OUTOF10: 3

## 2020-01-01 ASSESSMENT — PAIN DESCRIPTION - PAIN TYPE
TYPE: ACUTE PAIN;CHRONIC PAIN
TYPE: ACUTE PAIN;CHRONIC PAIN

## 2020-01-01 ASSESSMENT — PAIN DESCRIPTION - ORIENTATION
ORIENTATION: RIGHT;LEFT
ORIENTATION: RIGHT;LEFT

## 2020-01-01 ASSESSMENT — PAIN DESCRIPTION - LOCATION
LOCATION: FOOT;LEG
LOCATION: FOOT;LEG

## 2020-01-01 ASSESSMENT — PAIN - FUNCTIONAL ASSESSMENT: PAIN_FUNCTIONAL_ASSESSMENT: PREVENTS OR INTERFERES WITH MANY ACTIVE NOT PASSIVE ACTIVITIES

## 2020-01-01 ASSESSMENT — PAIN DESCRIPTION - DESCRIPTORS: DESCRIPTORS: CONSTANT;PENETRATING;SORE

## 2020-05-25 PROBLEM — R41.82 CHANGE IN MENTAL STATUS: Status: ACTIVE | Noted: 2020-01-01

## 2020-05-25 NOTE — ED PROVIDER NOTES
HPI:  5/25/20, Time: 11:16 AM EDT         Lyndsey Thacker is a 80 y.o. male presenting to the ED for altered mental status. Patient was brought in from his nursing facility, [facility]. Patient had an unwitnessed fall at the facility and was found on the ground. On known downtime. Patient found altered. Patient is normally alert and oriented x3. Review of Systems:   Limited 2/2 patient condition.       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 Past Surgical History:  has a past surgical history that includes eye surgery; Tonsillectomy; ECHO Complete 2D W Doppler W Color (11/10/2012); and Cardiac surgery (2003). Social History:  reports that he quit smoking about 37 years ago. His smoking use included cigarettes. He smoked 2.00 packs per day. He quit smokeless tobacco use about 38 years ago. His smokeless tobacco use included chew. He reports that he does not drink alcohol or use drugs. Family History: family history is not on file. The patients home medications have been reviewed.     Allergies: Pcn [penicillins] and Procaine hcl    -------------------------------------------------- RESULTS -------------------------------------------------  All laboratory and radiology results have been personally reviewed by myself   LABS:  Results for orders placed or performed during the hospital encounter of 05/25/20   CBC Auto Differential   Result Value Ref Range    WBC 7.8 4.5 - 11.5 E9/L    RBC 3.85 3.80 - 5.80 E12/L    Hemoglobin 11.0 (L) 12.5 - 16.5 g/dL    Hematocrit 35.1 (L) 37.0 - 54.0 %    MCV 91.2 80.0 - 99.9 fL    MCH 28.6 26.0 - 35.0 pg    MCHC 31.3 (L) 32.0 - 34.5 %    RDW 15.7 (H) 11.5 - 15.0 fL    Platelets 452 817 - 305 E9/L    MPV 13.9 (H) 7.0 - 12.0 fL    Neutrophils % 69.9 43.0 - 80.0 %    Immature Granulocytes % 0.5 0.0 - 5.0 %    Lymphocytes % 22.3 20.0 - 42.0 %    Monocytes % 5.5 2.0 - 12.0 %    Eosinophils % 1.5 0.0 - 6.0 %    Basophils % 0.3 0.0 - 2.0 %    Neutrophils Absolute 5.46 1.80 - 7.30 E9/L    Immature Granulocytes # 0.04 E9/L    Lymphocytes Absolute 1.74 1.50 - 4.00 E9/L    Monocytes Absolute 0.43 0.10 - 0.95 E9/L    Eosinophils Absolute 0.12 0.05 - 0.50 E9/L    Basophils Absolute 0.02 0.00 - 0.20 E9/L   Comprehensive Metabolic Panel   Result Value Ref Range    Sodium 149 (H) 132 - 146 mmol/L    Potassium 3.9 3.5 - 5.0 mmol/L    Chloride 108 (H) 98 - 107 mmol/L    CO2 24 22 - 29 mmol/L    Anion Gap 17 (H) 7 - 16 mmol/L    Glucose 103 (H) 74 - 99 mg/dL    BUN 26 (H) 8 - 23 mg/dL    CREATININE 1.7 (H) 0.7 - 1.2 mg/dL    GFR Non-African American 38 >=60 mL/min/1.73    GFR African American 46     Calcium 9.1 8.6 - 10.2 mg/dL    Total Protein 7.5 6.4 - 8.3 g/dL    Alb 3.5 3.5 - 5.2 g/dL    Total Bilirubin 0.4 0.0 - 1.2 mg/dL    Alkaline Phosphatase 85 40 - 129 U/L    ALT 8 0 - 40 U/L    AST 17 0 - 39 U/L   Ammonia   Result Value Ref Range    Ammonia 21.0 16.0 - 60.0 umol/L   Troponin   Result Value Ref Range    Troponin 0.02 0.00 - 0.03 ng/mL   Lactate, Sepsis   Result Value Ref Range    Lactic Acid, Sepsis 1.3 0.5 - 1.9 mmol/L   Urinalysis   Result Value Ref Range    Color, UA Yellow Straw/Yellow    Clarity, UA Clear Clear    Glucose, Ur Negative Negative mg/dL    Bilirubin Urine Negative Negative    Ketones, Urine Negative Negative mg/dL    Specific Gravity, UA 1.015 1.005 - 1.030

## 2020-05-25 NOTE — H&P
(THERAPEUTIC MULTIVITAMIN-MINERALS) tablet Take 1 tablet by mouth daily   Yes Historical Provider, MD   pantoprazole (PROTONIX) 40 MG tablet Take 40 mg by mouth daily   Yes Historical Provider, MD   mirtazapine (REMERON) 15 MG tablet Take 1 tablet by mouth nightly. 12/7/12  Yes Danna Barriga MD   carvedilol (COREG) 12.5 MG tablet Take 6.25 mg by mouth daily    Yes Historical Provider, MD   LISINOPRIL PO Take 20 mg by mouth 2 times daily. Yes Historical Provider, MD   Dextromethorphan-guaiFENesin  MG/5ML SYRP Take 5 mLs by mouth every 6 hours as needed for Cough    Historical Provider, MD   acetaminophen (TYLENOL) 325 MG tablet Take 650 mg by mouth every 6 hours as needed for Pain    Historical Provider, MD   butenafine (LOTRIMIN ULTRA) 1 % CREA Apply the legs, from the toes, in between the toes, up to the knee, daily for 2 months 10/22/18   Adolfo Alvarez MD   docusate sodium (COLACE) 100 MG capsule Take 100 mg by mouth nightly as needed for Constipation    Historical Provider, MD       Allergies:  Pcn [penicillins] and Procaine hcl    Social History:      The patient currently lives  In [facility]    TOBACCO:   reports that he quit smoking about 37 years ago. His smoking use included cigarettes. He smoked 2.00 packs per day. He quit smokeless tobacco use about 38 years ago. His smokeless tobacco use included chew. ETOH:   reports no history of alcohol use. Family History:     FAMILY NOT PRESENT, AND PT IS UNABLE TO GIVE A HISTORY    REVIEW OF SYSTEMS:   Pertinent positives as noted in the HPI. All other systems reviewed and negative. PHYSICAL EXAM:    /77   Pulse 86   Temp 98.4 °F (36.9 °C) (Axillary)   Resp 18   Ht 5' 6\" (1.676 m)   Wt 148 lb 8 oz (67.4 kg)   SpO2 93%   BMI 23.97 kg/m²     General appearance:  No apparent distress, appears  YOUNGER THAN stated age  HEENT:  Normal cephalic, atraumatic without obvious deformity. Pupils equal, round, and reactive to light.   Right

## 2020-05-26 NOTE — CONSULTS
Jeffy Esteban is a 80 y.o. 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 ([address] 10/10/2018    Bilateral leg ulcer, with fat layer exposed ([address] 10/22/2018    CAD (coronary artery disease)     Depression     Depression 12/3/2012    Dermatophytosis 10/10/2018    Diabetes with ulcer of lower extremity ([address] 10/22/2018    Hyperlipidemia     Hypertension     Non-pressure chronic ulcer of left lower leg, limited to breakdown of skin ([address] 10/29/2018    Ulcers of both lower legs, limited to breakdown of skin ([address] 10/10/2018    Venous stasis of both lower extremities 10/10/2018       Past Surgical History:     Past Surgical History:   Procedure Laterality Date    CARDIAC SURGERY  2003    triple bypass    ECHOCARDIOGRAM COMPLETE 2D W DOPPLER W COLOR  11/10/2012         EYE SURGERY      cataracts bilateral    TONSILLECTOMY         Allergies:     Pcn [penicillins] and Procaine hcl    Medications:     Prior to Admission medications    Medication Sig Start Date End Date Taking? Authorizing Provider   cilostazol (PLETAL) 100 MG tablet Take 100 mg by mouth 2 times daily   Yes Historical Provider, MD   metFORMIN (GLUCOPHAGE) 500 MG tablet Take 500 mg by mouth daily   Yes Historical Provider, MD   hydrochlorothiazide (MICROZIDE) 12.5 MG capsule Take 12.5 mg by mouth every other day    Yes Historical Provider, MD   Multiple Vitamins-Minerals (THERAPEUTIC MULTIVITAMIN-MINERALS) tablet Take 1 tablet by mouth daily   Yes Historical Provider, MD   pantoprazole (PROTONIX) 40 MG tablet Take 40 mg by mouth daily   Yes Historical Provider, MD   mirtazapine (REMERON) 15 MG tablet Take 1 tablet by mouth nightly.  12/7/12  Yes Snow Gong MD   carvedilol (COREG) 12.5 MG tablet Take 6.25 mg by mouth daily    Yes Historical Provider, MD   LISINOPRIL PO file     Physically abused: Not on file     Forced sexual activity: Not on file   Other Topics Concern    Not on file   Social History Narrative    Not on file         Review of Systems:     Review of Systems    All others negative      Family History:     History reviewed. No pertinent family history. History of Present Illness:   History obtained from chart    Patient curled up in bed, does not want to interact with examiner, would not give history. Objective:   BP (!) 189/78   Pulse 81   Temp 98.7 °F (37.1 °C) (Temporal)   Resp 18   Ht 5' 6\" (1.676 m)   Wt 148 lb 8 oz (67.4 kg)   SpO2 92%   BMI 23.97 kg/m²       General    Mental status: Alert, attentive  Appearance:  Calm, does not responds appropriately  Language: Dysarthric, mixed aphasia  Neck: No bruit bilaterally  Cardiac: Heart sounds dual, no murmur  Lungs: Clear to auscultation bilaterally    Neuro    CN: PERRL, EOMI bilaterally, blinks to threat bilaterally, sensation intact bilaterally, face activates symmetrically  Motor: no movement preference  Tone/Movements: paratonia throughout  Sensory: responds to noxious stimulus throughout  Reflexes: 1+ symmetrically without preponderance             Laboratory/Radiology:             I independently reviewed the labs and imaging studies     Assessment:     89M presenting after fall with AMS. Non lateralizing exam, does not appear post ictal, AMS does not comport with a stroke syndrome, likely acute decompensation of baseline neuropsychiatric/neurodegenerative process. Patient does not appear toxic, afebrile.     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

## 2020-05-26 NOTE — CONSULTS
Inpatient Cardiology Consultation      Reason for Consult:  Elevated troponin     Consulting Physician: Dr. Isidro Mccullough    Requesting Physician:  Dr. Jackie Muse    Date of Consultation: 5/26/2020    HISTORY OF PRESENT ILLNESS:   Mr. Aminah Little is a 80year old male who was previously seen in hospital consultation with Dr. Veronica Leyva in 11/2012 for evaluation of SOB. He underwent a TTE 11/10/2012 showing EF 65-70%, Trace MRGenesis     PMH: Depression/Anxiety, HTN, HLD, DM with chronic bilateral ulcers, BLE venous insufficiency, CAD with reported Bypass surgery (2003) -- further details are unknown. [facility] on 5/25/2020 from [facility] with AMS, unwitnessed fall and found down (further details are unknown). Patient is awake, alert but unable to follow commands and is nonverbal.    Upon arrival to the ED: /78, heart rate 90, afebrile, 97% on room air. Labs: Troponin 0.02, 0.03 with no rise in CK or CK-MB.  BUN/creatinine 26/1.7, TSH 2.030, hemoglobin A1c 5.9.  UA showing moderate amounts of blood. SARS-CoV-2, NAAT -negative. CXR: Hypoinflation bibasilar opacities. CT cervical spine: Atrophy and likely chronic microvascular ischemic disease, no fracture or dislocation. EKG: Sinus rhythm with frequent PVCs, rate 85 bpm, nonspecific ST-T wave changes. Please note: past medical records were reviewed per electronic medical record (EMR) - see detailed reports under Past Medical/ Surgical History. Past Medical History:    1. Anxiety/Depression  2. HTN  3. CAD with history of Bypass x 3 (2003) --further details are known  4. TTE 11/10/2012 showing EF 65-70%, Trace MRGenesis    5. DM with BLE ulcerations/wounds -- Follows with Dr. Alix Ceja. 6. HLD  7. GERD      Medications Prior to admit:  Prior to Admission medications    Medication Sig Start Date End Date Taking?  Authorizing Provider   cilostazol (PLETAL) 100 MG tablet Take 100 mg by mouth 2 times daily   Yes Historical Provider, MD   metFORMIN (GLUCOPHAGE) 500 MG tablet Take 500 mg by mouth daily   Yes Historical Provider, MD   hydrochlorothiazide (MICROZIDE) 12.5 MG capsule Take 12.5 mg by mouth every other day    Yes Historical Provider, MD   Multiple Vitamins-Minerals (THERAPEUTIC MULTIVITAMIN-MINERALS) tablet Take 1 tablet by mouth daily   Yes Historical Provider, MD   pantoprazole (PROTONIX) 40 MG tablet Take 40 mg by mouth daily   Yes Historical Provider, MD   mirtazapine (REMERON) 15 MG tablet Take 1 tablet by mouth nightly. 12/7/12  Yes Ru Irvin MD   carvedilol (COREG) 12.5 MG tablet Take 6.25 mg by mouth daily    Yes Historical Provider, MD   LISINOPRIL PO Take 20 mg by mouth 2 times daily.    Yes Historical Provider, MD   Dextromethorphan-guaiFENesin  MG/5ML SYRP Take 5 mLs by mouth every 6 hours as needed for Cough    Historical Provider, MD   acetaminophen (TYLENOL) 325 MG tablet Take 650 mg by mouth every 6 hours as needed for Pain    Historical Provider, MD   butenafine (LOTRIMIN ULTRA) 1 % CREA Apply the legs, from the toes, in between the toes, up to the knee, daily for 2 months 10/22/18   Ricardo Aguirre MD   docusate sodium (COLACE) 100 MG capsule Take 100 mg by mouth nightly as needed for Constipation    Historical Provider, MD       Current Medications:    Current Facility-Administered Medications: carvedilol (COREG) tablet 6.25 mg, 6.25 mg, Oral, Daily  cilostazol (PLETAL) tablet 100 mg, 100 mg, Oral, BID  mirtazapine (REMERON) tablet 15 mg, 15 mg, Oral, Nightly  therapeutic multivitamin-minerals 1 tablet, 1 tablet, Oral, Daily  pantoprazole (PROTONIX) tablet 40 mg, 40 mg, Oral, Daily  pravastatin (PRAVACHOL) tablet 10 mg, 10 mg, Oral, Daily  docusate sodium (COLACE) capsule 100 mg, 100 mg, Oral, Nightly  insulin lispro (HUMALOG) injection vial 0-10 Units, 0-10 Units, Subcutaneous, 4x Daily AC & HS  glucose (GLUTOSE) 40 % oral gel 15 g, 15 g, Oral, PRN  dextrose 50 % IV solution, 12.5 g, Intravenous, PRN  glucagon (rDNA) injection 1 mg, 1 mg, Intramuscular, PRN  dextrose 5 % solution, 100 mL/hr, Intravenous, PRN  ondansetron (ZOFRAN) injection 4 mg, 4 mg, Intravenous, Q6H PRN  acetaminophen (TYLENOL) tablet 650 mg, 650 mg, Oral, Q4H PRN  heparin (porcine) injection 5,000 Units, 5,000 Units, Subcutaneous, Q8H  dextrose 5 % solution, , Intravenous, Continuous  LORazepam (ATIVAN) injection 0.5 mg, 0.5 mg, Intravenous, Once    Allergies:  Pcn [penicillins] and Procaine hcl    Social History:    Social History     Socioeconomic History    Marital status:      Spouse name: Not on file    Number of children: Not on file    Years of education: Not on file    Highest education level: Not on file   Occupational History    Not on file   Social Needs    Financial resource strain: Not on file    Food insecurity     Worry: Not on file     Inability: Not on file    Transportation needs     Medical: Not on file     Non-medical: Not on file   Tobacco Use    Smoking status: Former Smoker     Packs/day: 2.00     Types: Cigarettes     Last attempt to quit: 10/19/1982     Years since quittin.6    Smokeless tobacco: Former User     Types: Chew     Quit date: 10/10/1981   Substance and Sexual Activity    Alcohol use: No    Drug use: No    Sexual activity: Never   Lifestyle    Physical activity     Days per week: Not on file     Minutes per session: Not on file    Stress: Not on file   Relationships    Social connections     Talks on phone: Not on file     Gets together: Not on file     Attends Rastafari service: Not on file     Active member of club or organization: Not on file     Attends meetings of clubs or organizations: Not on file     Relationship status: Not on file    Intimate partner violence     Fear of current or ex partner: Not on file     Emotionally abused: Not on file     Physically abused: Not on file     Forced sexual activity: Not on file   Other Topics Concern    Not on file   Social History Narrative    Not on file Family History:   Noncontributory due to advanced age. REVIEW OF SYSTEMS:   Unable to assess due to the patient is nonverbal.      PHYSICAL EXAM:   BP (!) 189/78   Pulse 81   Temp 98.7 °F (37.1 °C) (Temporal)   Resp 18   Ht 5' 6\" (1.676 m)   Wt 148 lb 8 oz (67.4 kg)   SpO2 92%   BMI 23.97 kg/m²   CONST:  Ill-appearing elderly male who appears of stated age. Awake, alert but is unable to follow commands. Nonverbal. No apparent distress. HEENT:   Head- Normocephalic, atraumatic   Eyes- Conjunctivae pink, anicteric  Throat- Oral mucosa pink and moist  Neck-  No stridor, trachea midline, no jugular venous distention. No carotid bruit. CHEST: Chest symmetrical and non-tender to palpation. No accessory muscle use or intercostal retractions  RESPIRATORY: Lung sounds - diminished in bases. CARDIOVASCULAR:     Heart Inspection- shows no noted pulsations  Heart Palpation- no heaves or thrills; PMI is non-displaced   Heart Ausculation- Regular rate and rhythm, no murmur. No s3, s4 or rub   PV: +Dry, flaky skin with scabs, BLE Kerlex wraps. ABDOMEN: Soft, non-tender to light palpation. Bowel sounds present. No palpable masses no organomegaly; no abdominal bruit  MS: Good muscle tone, unable to assess strength. No atrophy or abnormal movements. :  Almaguer draining jason colored urine. SKIN: Warm and dry no statis dermatitis or ulcers   NEURO / PSYCH:  Unable to assess due to the patient is non-verbal. He is awake and alert but unable to follow commands. DATA:    ECG / Tele strips: SR with PVCs.    Diagnostic:      Intake/Output Summary (Last 24 hours) at 5/26/2020 1148  Last data filed at 5/26/2020 0650  Gross per 24 hour   Intake --   Output 1100 ml   Net -1100 ml       Labs:   CBC:   Recent Labs     05/25/20  1118   WBC 7.8   HGB 11.0*   HCT 35.1*        BMP:   Recent Labs     05/25/20  1118 05/26/20  0747   * 149*   K 3.9 3.3*   CO2 24 24   BUN 26* 28*   CREATININE 1.7* 1.7* LABGLOM 38 38   CALCIUM 9.1 8.7     TSH:   Recent Labs     05/25/20  1617   TSH 2.030     HgA1c:   Lab Results   Component Value Date    LABA1C 5.9 (H) 05/25/2020     No results found for: EAG  proBNP: No results for input(s): PROBNP in the last 72 hours. PT/INR:   Recent Labs     05/25/20  1118   PROTIME 12.7*   INR 1.1     APTT:No results for input(s): APTT in the last 72 hours. CARDIAC ENZYMES:  Recent Labs     05/25/20  1118 05/25/20  1617 05/25/20  2354   CKTOTAL 78 91 126   CKMB  --  2.2 1.7   TROPONINI 0.02 0.03 0.04*     FASTING LIPID PANEL:  Lab Results   Component Value Date    CHOL 96 07/19/2011    HDL 32.0 07/19/2011    LDLCALC 39 07/19/2011    TRIG 126 07/19/2011     LIVER PROFILE:  Recent Labs     05/25/20  1118   AST 17   ALT 8   LABALBU 3.5     CXR: 5/25/2020  Hypoinflation with bibasilar opacities. Differential includes   pulmonary edema, atelectasis, infection, and/or layering pleural   effusions.         XR Pelvis: 5/25/2020  No acute osseous abnormality seen. CT Cervical Spine:5/25/2020    CT brain: Atrophy and likely chronic microvascular ischemic disease. CT C-spine: No acute fracture or dislocation. Degenerative spondylotic   changes as noted. CT Head: 5/25/2020  CT brain: Atrophy and likely chronic microvascular ischemic disease. CT C-spine: No acute fracture or dislocation. Degenerative spondylotic   changes as noted. MRI Brain: pending. Assessment/Plan:   1. Admitted from nursing home with altered mental status and fall  2. Mildly elevated troponin (0.04): multifactorial in the setting of recent fall and acute on CKD. 3. PVCs / ectopy   4. Hx of CABG x 3 (2003). 5. Acute encephalopathy/Nonverbal:  ? Baseline unknown. MRI pending. 6. Acute on CKD  7. Hypokalemia   8. Insulin requiring DM  9. Chronic BLE venous insufficiency / nonhealing ulcers  9. Anxiety/Depression   10. HTN  11.  HLD     -Replace K+: Recommend keeping K+ > 4 and Mg++ > 2.   -Continue me if you have any questions or concerns.     Neema Serrano MD  [facility] Cardiology

## 2020-05-26 NOTE — PROGRESS NOTES
Physical Therapy    PT orders received and chart reviewed to attempt eval. Pt stating \"no\" and kicking at therapist during attempt to remove covers. PT attempting to provide reassurance to pt without success, pt continues to refuse at this time. PT will follow and attempt again at later time/date as able. Thank you.     Josue Almaguer, PT, DPT  [id number]

## 2020-05-26 NOTE — PROGRESS NOTES
Patient vomited large amount of clear then yellow emesis. Patient suctioned, HOB elevated. Unable to assess if patient is feeling nauseated. Resting comfortably at this time.

## 2020-05-26 NOTE — PROGRESS NOTES
Message left with son, we need MRI checklist complete, only emergency number listed. Patient unable to provide.

## 2020-05-27 NOTE — PROGRESS NOTES
Education [x]  Functional Mobility training [x]              Environmental Modifications [x]  Cognitive re-training [x]    Compensatory techniques for ADLs [x]  Splinting Needs []    Positioning to improve overall function [x]   Therapeutic Activity [x]               Therapeutic Exercise  [x]  Visual/Perceptual: []     Delirium prevention/treatment  []   Other:  []    Rehab Potential: Good for established goals    Patient / Family Goal:  Not stated     Evaluation time includes thorough review of current medical information, gathering information on past medical & social history & PLOF, completion of standardized testing, informal observation of tasks, consultation with other medical professions/disciplines, assessment of data & development of POC/goals.      Evaluation Complexity: Low    Treatment Time In: 6436            Treatment Time Out:  8272             Treatment Charges: Mins Units   Ther Ex  51326     Manual Therapy 64813     Thera Activities 28240     ADL/Home Mgt 59998 10 1   Neuro Re-ed 71667     Group Therapy      Orthotic manage/training  18516     Non-Billable Time     Total Timed Treatment [address] [place] [address] [id number]

## 2020-05-27 NOTE — PROGRESS NOTES
Adele Webster is a 80 y.o. right handed male     Neurology is following for AMS    PMH: HTN, HDL, DM, CAD, atherosclerosis, anxiety and depression    Pt presented to ED on 5/25 for altered mental status from [facility]. Patient had a unwitnessed fall at the facility and was found on the ground. Patient had unknown amount of downtime and was altered when he was found. Patient is normally alert and oriented x 3. On arrival to ED blood pressure 214/78, blood glucose 93   CT head showed atrophy and likely chronic microvascular ischemic disease. CT C-spine showed no acute fracture or dislocation. Degenerative spondylitic changes as noted. MRI brain could not be completed as patient is poor historian and family is unsure of patient's history. Patient is intermittently hypertensive    (+) generalized weakness  Denies pain, distress, headache, dizziness    Objective:     BP (!) 174/74   Pulse 73   Temp 97 °F (36.1 °C) (Temporal)   Resp 18   Ht 5' 6\" (1.676 m)   Wt 148 lb 8 oz (67.4 kg)   SpO2 95%   BMI 23.97 kg/m²     General appearance: alert, appears stated age, cooperative and no distress  Head: normocephalic, without obvious abnormality, atraumatic  Eyes: conjunctivae/corneas clear.    Neck: symmetrical, trachea midline and thyroid not enlarged  Lungs: Diminished to auscultation bilaterally, unlabored breaths  Heart: regular rate and rhythm, NSR on telemetry  Extremities: normal, atraumatic, no cyanosis or edema  Pulses: 2+ and symmetric  Skin: color, texture, turgor normal---no rashes or lesions      Mental Status: Alert, oriented to person, time, situation and place; able to state his age and current president, intermittently tearful during exam and after encouragement stops immediately; follows simple commands for me    Appropriate attention/concentration  Intact fundus of knowledge  Intact memories    Speech: no dysarthria; very loose dentures  Language: no aphasias    Cranial Nerves:  I: smell NA   II: visual acuity  NA   II: visual fields  blinks to threat bilaterally   II: pupils PERRL   III,VII: ptosis None   III,IV,VI: extraocular muscles   tracks examiner   V: mastication Normal   V: facial light touch sensation  Normal   V,VII: corneal reflex     VII: facial muscle function - upper  Normal   VII: facial muscle function - lower  appears normal   VIII: hearing Normal   IX: soft palate elevation  Normal   IX,X: gag reflex    XI: trapezius strength  5/5   XI: sternocleidomastoid strength 5/5   XI: neck extension strength  5/5   XII: tongue strength  Normal     Motor:  Generalized weakness throughout   BUE 4+/5, BLE 3/5   Normal bulk   Paratonia throughout    Sensory:  LT intact    Coordination:   FN, FFM and STACY intact     Gait:  Not tested     DTR:   Right Brachioradialis reflex 1+  Left Brachioradialis reflex 1+  Right Biceps reflex 1+  Left Biceps reflex 1+  Right Triceps reflex 1+  Left Triceps reflex 1+  Right Quadriceps reflex 1+  Left Quadriceps reflex 1+  Right Achilles reflex 1+  Left Achilles reflex 1+    No Babinskis  No Thornton's    No pathological reflexes    Laboratory/Radiology:     CBC:   Lab Results   Component Value Date    WBC 7.8 05/25/2020    RBC 3.85 05/25/2020    HGB 11.0 05/25/2020    HCT 35.1 05/25/2020    MCV 91.2 05/25/2020    MCH 28.6 05/25/2020    MCHC 31.3 05/25/2020    RDW 15.7 05/25/2020     05/25/2020    MPV 13.9 05/25/2020     CMP:    Lab Results   Component Value Date     05/26/2020    K 3.3 05/26/2020     05/26/2020    CO2 24 05/26/2020    BUN 28 05/26/2020    CREATININE 1.7 05/26/2020    GFRAA 46 05/26/2020    LABGLOM 38 05/26/2020    GLUCOSE 147 05/26/2020    PROT 7.5 05/25/2020    LABALBU 3.5 05/25/2020    CALCIUM 8.7 05/26/2020    BILITOT 0.4 05/25/2020    ALKPHOS 85 05/25/2020    AST 17 05/25/2020    ALT 8 05/25/2020     Hepatic Function Panel:    Lab Results   Component Value Date    ALKPHOS 85 05/25/2020

## 2020-05-27 NOTE — PLAN OF CARE
All notes reviewed. Assessment/imaging:  Repeat CT head completed today shows no acute process. Orthostatic vitals with lying and sitting unrevealing. Plan:  Continue supportive care and correcting metabolic abnormalities. At present time there is no indications for LP  Continue PT/OT    Neuro will sign off. Okay to discharge from neuro standpoint to rehab/SNF once medically cleared. Patient can follow-up with neurology outpatient for dementia work-up once back in normal setting.

## 2020-05-27 NOTE — PLAN OF CARE
Problem: Falls - Risk of:  Goal: Will remain free from falls  Description: Will remain free from falls  5/27/2020 0144 by Mary Jo Cintron RN  Outcome: Met This Shift  5/26/2020 1855 by Johnny Carmona RN  Outcome: Met This Shift  Goal: Absence of physical injury  Description: Absence of physical injury  5/27/2020 0144 by Mary Jo Cintron RN  Outcome: Met This Shift  5/26/2020 1855 by Johnny Carmona RN  Outcome: Met This Shift

## 2020-05-27 NOTE — CARE COORDINATION
SW called [employer] [phone] to discuss prior level of function and possibility of return for patient,  states nurse is with someone and will call this SW back. Gave cell number to reach DAVID.

## 2020-05-27 NOTE — PROGRESS NOTES
Hospitalist Progress Note      PCP: Juanita Flores MD    Date of Admission: 5/25/2020    Chief Complaint: * fall     Hospital Course: *80 y. o. male who presented to [facility] with *fall, unwitnessed.  Per the ER he is usually up, lucid, and walking around.   CT of head and neck is  Unremarkable. He has a known history of II DM, diabetic ulcers, HLD, HTN, depression  And PVD** in atrial fib, cannot  Find a history of it, cardiology consulted, and his TT was elevated, they agree with continuing coreg, and holding acei and HCTA. . Brother does not know history , cannot get MRI history , so MRI not done, neurology  Ordered repeat CT of head. , unremarkable for an acute event. Pt is more lucid today. Subjective:  Alert to person and place       Medications:  Reviewed    Infusion Medications    dextrose       Scheduled Medications    potassium bicarb-citric acid  40 mEq Oral BID    carvedilol  6.25 mg Oral Daily    cilostazol  100 mg Oral BID    mirtazapine  15 mg Oral Nightly    therapeutic multivitamin-minerals  1 tablet Oral Daily    pantoprazole  40 mg Oral Daily    pravastatin  10 mg Oral Daily    docusate sodium  100 mg Oral Nightly    insulin lispro  0-10 Units Subcutaneous 4x Daily AC & HS    heparin (porcine)  5,000 Units Subcutaneous Q8H    LORazepam  0.5 mg Intravenous Once     PRN Meds: glucose, dextrose, glucagon (rDNA), dextrose, ondansetron, acetaminophen      Intake/Output Summary (Last 24 hours) at 5/27/2020 1431  Last data filed at 5/27/2020 1027  Gross per 24 hour   Intake 2156 ml   Output 1350 ml   Net 806 ml       Exam:    BP (!) 185/59   Pulse 59   Temp 97.5 °F (36.4 °C) (Temporal)   Resp 18   Ht 5' 6\" (1.676 m)   Wt 148 lb 8 oz (67.4 kg)   SpO2 95%   BMI 23.97 kg/m²       General appearance:  No apparent distress, appears  YOUNGER THAN stated age  HEENT:  Normal cephalic,    Neck: Supple, with full range of motion. No jugular venous distention.  Trachea midline. Respiratory:  Normal respiratory effort. Clear to auscultation, bilaterally without Rales/Wheezes/Rhonchi. Cardiovascular:  Irregular   Abdomen: Soft, non-tender, non-distended with normal bowel sounds. Musculoskeletal:  No clubbing, cyanosis or edema bilaterally.    Skin: dry flaking skin, and scabs on legs, and ecchymotic area on hands  Neurologic: moves all 4 extrem  Psychiatric:   oriented to person,place, not  time,   Capillary Refill: Brisk,< 3 seconds   Peripheral Pulses: +2 palpable, equal bilaterally               Labs:   Recent Labs     05/25/20  1118   WBC 7.8   HGB 11.0*   HCT 35.1*        Recent Labs     05/25/20  1118 05/26/20  0747 05/27/20  1012   * 149* 137   K 3.9 3.3* 3.1*   * 105 98   CO2 24 24 27   BUN 26* 28* 30*   CREATININE 1.7* 1.7* 1.8*   CALCIUM 9.1 8.7 8.5*     Recent Labs     05/25/20  1118   AST 17   ALT 8   BILITOT 0.4   ALKPHOS 85     Recent Labs     05/25/20  1118   INR 1.1     Recent Labs     05/25/20  1118 05/25/20  1617 05/25/20  2354   CKTOTAL 78 91 126   TROPONINI 0.02 0.03 0.04*     Recent Labs     05/25/20  1118   AST 17   ALT 8   BILITOT 0.4   ALKPHOS 85     No results for input(s): LACTA in the last 72 hours.   No results found for: Fatmata Garza  Lab Results   Component Value Date    AMMONIA 21.0 05/25/2020       Assessment:    Active Hospital Problems    Diagnosis Date Noted    Change in mental status [R41.82] 05/25/2020   FALL(unwitnessed)  BARBI  CKD3  HTN  IIDM  DEPRESSION  HTN  HLD  Atrial fib(? If has a history)  Dermatophytosis   Hypernatremia  resolved  hypokalemia        PLAN:  Hold diuretics  Orthostatics  Pt/ot eval  Wound care nurse  SSI  Neuro checks   replace K      DVT Prophylaxis:  heparin  Diet: DIET CARB CONTROL;  Code Status: Full Code     PT/OT Eval Status: *ordered     Dispo - *back to [facility]         Electronically signed by Carmen Chavira DO on 5/27/2020 at 2:31 PM [facility]

## 2020-05-28 PROBLEM — I95.9 HYPOTENSION: Status: ACTIVE | Noted: 2020-01-01

## 2020-05-28 NOTE — DISCHARGE INSTR - COC
layer exposed ([facility] [address] [address] L97.912, L97.922    Non-pressure chronic ulcer of left lower leg, limited to breakdown of skin ([facility] [address] L97.921    Change in mental status R41.82    Hypotension I95.9       Isolation/Infection:   Isolation          No Isolation        Patient Infection Status     Infection Onset Added Last Indicated Last Indicated By Review Planned Expiration Resolved Resolved By    None active    Resolved    COVID-19 Rule Out 05/25/20 05/25/20 05/25/20 COVID-19 (Ordered)   05/25/20 Rule-Out Test Resulted          Nurse Assessment:  Last Vital Signs: BP (!) 130/59   Pulse 94   Temp 98.3 °F (36.8 °C) (Temporal)   Resp 18   Ht 5' 6\" (1.676 m)   Wt 148 lb 8 oz (67.4 kg)   SpO2 92%   BMI 23.97 kg/m²     Last documented pain score (0-10 scale): Pain Level: 0  Last Weight:   Wt Readings from Last 1 Encounters:   05/25/20 148 lb 8 oz (67.4 kg)     Mental Status:  oriented, alert and able to concentrate and follow conversation    IV Access:  - None    Nursing Mobility/ADLs:  Walking   Dependent  Transfer  Dependent  Bathing  Dependent  Dressing  Dependent  Toileting  Dependent  Feeding  Assisted, needs set up  Med Admin  Assisted  Med Delivery   whole    Wound Care Documentation and Therapy:  Wound 10/22/18 Venous ulcer Leg Left; Lower #15 acq: 10-22-17 Blocked (Active)   Number of days: 584       Wound 10/22/18 Other (Comment) Foot Left;Dorsal #16 acq 10-22-17 Blocked Bender I DFU (Active)   Number of days: 584        Elimination:  Continence:   · Bowel: Yes  · Bladder: Yes  Urinary Catheter: None   Colostomy/Ileostomy/Ileal Conduit: No    Date of Last BM: 5/29/2020    Intake/Output Summary (Last 24 hours) at 5/28/2020 1207  Last data filed at 5/28/2020 0657  Gross per 24 hour   Intake --   Output 700 ml   Net -700 ml     I/O last 3 completed shifts: In: 1108 [P.O.:120;  I.V.:988]  Out: 1700 [Urine:1700]    Safety Concerns:     History of Falls (last 30 days) and At Risk for Falls    Impairments/Disabilities:

## 2020-05-28 NOTE — PROGRESS NOTES
Date: 2020       Patient Name: Maulik Quiroz  : 1927      MRN: [id number]    Patient currently unavailable for physical therapy services as patient off the floor for testing.    Will follow up as appropriate    Barby Cortez PT, DPT  [id number]

## 2020-05-28 NOTE — CONSULTS
(REMERON) tablet 15 mg, 15 mg, Oral, Nightly  [Held by provider] pantoprazole (PROTONIX) tablet 40 mg, 40 mg, Oral, Daily  [Held by provider] pravastatin (PRAVACHOL) tablet 10 mg, 10 mg, Oral, Daily  [Held by provider] docusate sodium (COLACE) capsule 100 mg, 100 mg, Oral, Nightly  insulin lispro (HUMALOG) injection vial 0-10 Units, 0-10 Units, Subcutaneous, 4x Daily AC & HS  glucose (GLUTOSE) 40 % oral gel 15 g, 15 g, Oral, PRN  dextrose 50 % IV solution, 12.5 g, Intravenous, PRN  glucagon (rDNA) injection 1 mg, 1 mg, Intramuscular, PRN  dextrose 5 % solution, 100 mL/hr, Intravenous, PRN  ondansetron (ZOFRAN) injection 4 mg, 4 mg, Intravenous, Q6H PRN  acetaminophen (TYLENOL) tablet 650 mg, 650 mg, Oral, Q4H PRN  heparin (porcine) injection 5,000 Units, 5,000 Units, Subcutaneous, Q8H  LORazepam (ATIVAN) injection 0.5 mg, 0.5 mg, Intravenous, Once  Allergies:  Pcn [penicillins] and Procaine hcl    Social History:   N/C  Family History:   History reviewed. No pertinent family history. REVIEW OF SYSTEMS:    H&P    PHYSICAL EXAM:    VITALS:  BP (!) 130/59   Pulse 94   Temp 98.3 °F (36.8 °C) (Temporal)   Resp 18   Ht 5' 6\" (1.676 m)   Wt 148 lb 8 oz (67.4 kg)   SpO2 92%   BMI 23.97 kg/m²   24HR INTAKE/OUTPUT:    Intake/Output Summary (Last 24 hours) at 2020 1248  Last data filed at 2020 9020  Gross per 24 hour   Intake --   Output 700 ml   Net -700 ml     URINARY CATHETER OUTPUT ([place]):     TEMPERATURE:  Current - Temp: 98.3 °F (36.8 °C);  Max - Temp  Av °F (36.7 °C)  Min: 97.6 °F (36.4 °C)  Max: 98.4 °F (36.9 °C)  RESPIRATIONS RANGE: Resp  Av  Min: 16  Max: 20  PULSE RANGE: Pulse  Av.1  Min: 65  Max: 97  BLOOD PRESSURE RANGE:  Systolic (98ZCG), GUC:572 , Min:130 , IHV:040   ; Diastolic (94FYK), WTY:98, Min:59, Max:95    CONSTITUTIONAL:  awake, fatigued, distracted, no apparent distress, appears stated age and normal weight  EYES:  lids and lashes normal, pupils equal, round and

## 2020-05-28 NOTE — PROGRESS NOTES
Dr. Lori Bains aware of results of KUB.    Electronically signed by Tanja Aquino RN on 5/28/2020 at 11:08 AM

## 2020-05-29 NOTE — PROGRESS NOTES
Occupational Therapy  OT BEDSIDE TREATMENT NOTE      Date:2020  Patient Name: Maria Teresa Woodruff  MRN: [id number]  : 1927  Room: [address] [place] [address]     Referring Provider: Lisset Vega DO     Evaluating OT: Judy Hudson OTR/ANNMARIE #[id number]     AM-PAC Daily Activity Raw Score: 10/24     Recommended Adaptive Equipment: To be further assessed       Diagnosis: change in mental status         Pertinent Medical History: CAD, depression, HTN, non-pressure chronic ulcers BLE     Precautions:  Falls, bed alarm, BLE wounds, cognition     Home Living: Pt lives in an [address] [place] [address] Road owned: w/c,   Prior Level of Function: Questionable historian, Pt stated he is mostly indep  with ADLs , assist with IADLs; using w/c for mobility . Driving: no     Pain Level: none reported       Cognition: A&O: 3/4; Follows 1 step directions, inconsistently, requires repitition               Memory:  fair               Sequencing:  poor              Problem solving:  poor              Judgement/safety:  poor                Functional Assessment:    Initial Eval Status  Date: 20 Treatment Status  Date: 20 STG/LTG  Frequency/duration  2-3x/week, 5-7 days   Feeding Moderate Assist   To set up and position containters to avoid spillage of food  Mod A Supervision    Grooming Maximal Assist   To put dentures in his mouth. Poor problem solving and  Mod A  To comb hair while seated EOB, Mod A for sitting balance  Stand by Assist    UB Dressing Maximal Assist  Max A  Per eval  Stand by Assist    LB Dressing Dependent  Dependent  To don/doff socks while seated upright in bed  Moderate Assist    Bathing Maximal Assist Max A  simulated  Minimal Assist    Toileting Dependent  Dependent  Catheter and FMS system  Minimal Assist    Bed Mobility  Supine to sit: Moderate Assist   Sit to supine:  Moderate Assist  Max A- supine<->sit  Educated pt on technique to increase independence.    Supine to sit: Supervision   Sit to supine: Supervision

## 2020-05-29 NOTE — PROGRESS NOTES
0.43 05/25/2020    LYMPHSABS 1.74 05/25/2020    EOSABS 0.12 05/25/2020    BASOSABS 0.02 05/25/2020     CMP:    Lab Results   Component Value Date     05/29/2020    K 3.9 05/29/2020     05/29/2020    CO2 23 05/29/2020    BUN 46 05/29/2020    CREATININE 2.3 05/29/2020    GFRAA 32 05/29/2020    LABGLOM 27 05/29/2020    GLUCOSE 121 05/29/2020    GLUCOSE 133 07/19/2011    PROT 6.4 05/29/2020    LABALBU 2.9 05/29/2020    LABALBU 4.4 07/19/2011    CALCIUM 8.6 05/29/2020    BILITOT 0.4 05/29/2020    ALKPHOS 69 05/29/2020    AST 14 05/29/2020    ALT 9 05/29/2020       Current Inpatient Medications    Current Facility-Administered Medications: dextrose 5% and 0.9% sodium chloride with KCl 40 mEq infusion, , Intravenous, Continuous  miconazole nitrate 2 % ointment, , Topical, BID **AND** miconazole nitrate 2 % ointment, , Topical, TID PRN  mineral oil-hydrophilic petrolatum (HYDROPHOR) ointment, , Topical, TID PRN  metoprolol (LOPRESSOR) injection 2.5 mg, 2.5 mg, Intravenous, Q6H  HYDROmorphone (DILAUDID) injection 0.5 mg, 0.5 mg, Intravenous, Q4H PRN  [Held by provider] amLODIPine (NORVASC) tablet 5 mg, 5 mg, Oral, Daily  [Held by provider] carvedilol (COREG) tablet 6.25 mg, 6.25 mg, Oral, Daily  [Held by provider] cilostazol (PLETAL) tablet 100 mg, 100 mg, Oral, BID  [Held by provider] mirtazapine (REMERON) tablet 15 mg, 15 mg, Oral, Nightly  [Held by provider] pantoprazole (PROTONIX) tablet 40 mg, 40 mg, Oral, Daily  [Held by provider] pravastatin (PRAVACHOL) tablet 10 mg, 10 mg, Oral, Daily  [Held by provider] docusate sodium (COLACE) capsule 100 mg, 100 mg, Oral, Nightly  insulin lispro (HUMALOG) injection vial 0-10 Units, 0-10 Units, Subcutaneous, 4x Daily AC & HS  glucose (GLUTOSE) 40 % oral gel 15 g, 15 g, Oral, PRN  dextrose 50 % IV solution, 12.5 g, Intravenous, PRN  glucagon (rDNA) injection 1 mg, 1 mg, Intramuscular, PRN  dextrose 5 % solution, 100 mL/hr, Intravenous, PRN  ondansetron (ZOFRAN) injection 4 mg, 4 mg, Intravenous, Q6H PRN  acetaminophen (TYLENOL) tablet 650 mg, 650 mg, Oral, Q4H PRN  heparin (porcine) injection 5,000 Units, 5,000 Units, Subcutaneous, Q8H  LORazepam (ATIVAN) injection 0.5 mg, 0.5 mg, Intravenous, Once    ASSESSMENT AND PLAN volume contraction continue hydration  Oral intake addressed with staff  Prerenal azotemia addressed with IV fluids  Mobilize plan long term care

## 2020-05-29 NOTE — PROGRESS NOTES
Hospitalist Progress Note      SYNOPSIS: Patient admitted on 2020 after unwitnessed fall and altered mental status. Has history of type 2 diabetes, diabetic ulcers, hyperlipidemia, hypertension and PVD. CT of the head and neck unremarkable. Neurology was consulted. Subsequent to admission he began to have nausea vomiting and diarrhea. Abdominals series shows possible ileus. General surgery consulted for small bowel follow-through. This was completed today and was unremarkable. In the last 24 hours the patient has improved significantly no longer any type of nausea or vomiting or diarrhea. Has been eating without difficulty. SUBJECTIVE:    Patient seen and examined  Records reviewed. Patient denies any pain or discomfort. No acute events overnight. Denies any nausea or vomiting. Reports his appetite has improved. Stable overnight. No other overnight issues reported. Temp (24hrs), Av.8 °F (36.6 °C), Min:97.4 °F (36.3 °C), Max:98 °F (36.7 °C)    DIET: DIET LOW FIBER;  CODE: Full Code    Intake/Output Summary (Last 24 hours) at 2020 1610  Last data filed at 2020 1431  Gross per 24 hour   Intake 180 ml   Output 1425 ml   Net -1245 ml       OBJECTIVE:    BP (!) 174/70   Pulse (!) 43   Temp 98 °F (36.7 °C) (Temporal)   Resp 18   Ht 5' 6\" (1.676 m)   Wt 148 lb 8 oz (67.4 kg)   SpO2 97%   BMI 23.97 kg/m²     General appearance: No apparent distress, appears stated age and cooperative. HEENT:  Conjunctivae/corneas clear. Neck: Supple. No jugular venous distention. Respiratory: Clear to auscultation bilaterally, normal respiratory effort  Cardiovascular: Regular rate rhythm, normal S1-S2  Abdomen: Soft, nontender, nondistended  Musculoskeletal: No clubbing, cyanosis, no bilateral lower extremity edema. Brisk capillary refill.    Skin:  No rashes  on visible skin  Neurologic: awake, alert and following commands     ASSESSMENT:  58-year-old male admitted on May 25, 2024

## 2020-05-29 NOTE — PROGRESS NOTES
Sitting EOB:  Supervision  Dynamic Standing:  Min A with AAD      Pt is A & O x 3  Sensation:  Pt denies numbness and tingling to extremities  Edema:  unremarkable      Patient education  Pt educated on role of PT    Patient response to education:   Pt verbalized understanding Pt demonstrated skill Pt requires further education in this area   x x x     ASSESSMENT:    Comments:  Pt received in supine agreeable to PT. Pt demonstrates strength, balance, and endurance deficits. Pt requiring assistance of trunk and BLEs for supine <> sit transfer. Pt sitting statically with posterior lean, tolerating ~ 8 min. Max cues for correction. Pt declined stance due to fatigue. PT will continue with plan of care, and progress pt as able. Treatment:  Patient practiced and was instructed in the following treatment:     Bed mobility - verbal cues for facilitation of mobility   Neuromuscular reeducation- verbal cues for trunk flexion to correct posterior lean as well as improve BUE support. PLAN:    Patient is making good progress towards established goals. Will continue with current POC.       Time in  1100  Time out  1115    Total Treatment Time  15 minutes     CPT codes:  [] Gait training 95748 0 minutes  [] Manual therapy 30052 0 minutes  [x] Therapeutic activities 87649 15 minutes  [] Therapeutic exercises 34122 0 minutes  [] Neuromuscular reeducation 16013 0 minutes    Jodelle Goldmann PT, DPT  [id number]

## 2020-05-29 NOTE — PROGRESS NOTES
Call placed to Dr. Lucy Camejo regarding patient. Patient's heart rate is 43 and irregular. Patient BP is 174/70. Patient is ordered to receive lopressor 2.5mg IV. Dr. Lucy Camejo sttaed to hold the lopressor and order hydralazine 10mg IV Q4 hours PRN for systolic greater than 430.    Electronically signed by Jesus Evans RN on 5/29/2020 at 2:49 PM

## 2020-05-30 NOTE — PROGRESS NOTES
Intramuscular, PRN  dextrose 5 % solution, 100 mL/hr, Intravenous, PRN  ondansetron (ZOFRAN) injection 4 mg, 4 mg, Intravenous, Q6H PRN  acetaminophen (TYLENOL) tablet 650 mg, 650 mg, Oral, Q4H PRN  heparin (porcine) injection 5,000 Units, 5,000 Units, Subcutaneous, Q8H  LORazepam (ATIVAN) injection 0.5 mg, 0.5 mg, Intravenous, Once    ASSESSMENT AND PLAN chemistry improved correcting  Oral intake improved  Need to increase mobility  Prerenal azotemia addressed  Plan for long term care in progress

## 2020-05-30 NOTE — PLAN OF CARE
Problem: Falls - Risk of:  Goal: Will remain free from falls  Description: Will remain free from falls  Outcome: Met This Shift  Goal: Absence of physical injury  Description: Absence of physical injury  Outcome: Met This Shift     Problem: Daily Care:  Goal: Daily care needs are met  Description: Daily care needs are met  Outcome: Met This Shift

## 2020-05-31 PROBLEM — K56.7 ILEUS (HCC): Status: ACTIVE | Noted: 2020-01-01

## 2020-05-31 PROBLEM — R77.8 ELEVATED TROPONIN: Status: ACTIVE | Noted: 2020-01-01

## 2020-05-31 NOTE — PLAN OF CARE
Problem: Falls - Risk of:  Goal: Will remain free from falls  Description: Will remain free from falls  5/31/2020 1435 by Radha Mckenna RN  Outcome: Met This Shift  5/31/2020 0151 by Elyse Knowles RN  Outcome: Met This Shift  Goal: Absence of physical injury  Description: Absence of physical injury  5/31/2020 1435 by Radha Mckenna RN  Outcome: Met This Shift  5/31/2020 0151 by Elyse Knowles RN  Outcome: Met This Shift     Problem: Daily Care:  Goal: Daily care needs are met  Description: Daily care needs are met  5/31/2020 1435 by Radha Mckenna RN  Outcome: Met This Shift  5/31/2020 0151 by Elyse Knowles RN  Outcome: Met This Shift     Problem: Skin Integrity:  Goal: Skin integrity will stabilize  Description: Skin integrity will stabilize  5/31/2020 1435 by Radha Mckenna RN  Outcome: Met This Shift  5/31/2020 0151 by Elyse Knowles RN  Outcome: Met This Shift     Problem: Skin Integrity:  Goal: Will show no infection signs and symptoms  Description: Will show no infection signs and symptoms  5/31/2020 1435 by Radha Mckenna RN  Outcome: Met This Shift  5/31/2020 0151 by Elyse Knowles RN  Outcome: Not Met This Shift     Problem: Serum Glucose Level - Abnormal:  Goal: Ability to maintain appropriate glucose levels will improve  Description: Ability to maintain appropriate glucose levels will improve  5/31/2020 0151 by Elyse Knowles RN  Outcome: Met This Shift

## 2020-05-31 NOTE — PROGRESS NOTES
0.02 05/25/2020     CMP:    Lab Results   Component Value Date     05/31/2020    K 4.2 05/31/2020     05/31/2020    CO2 19 05/31/2020    BUN 28 05/31/2020    CREATININE 1.4 05/31/2020    GFRAA 57 05/31/2020    LABGLOM 47 05/31/2020    GLUCOSE 125 05/31/2020    GLUCOSE 133 07/19/2011    PROT 6.0 05/31/2020    LABALBU 2.3 05/31/2020    LABALBU 4.4 07/19/2011    CALCIUM 8.7 05/31/2020    BILITOT 0.3 05/31/2020    ALKPHOS 71 05/31/2020    AST 22 05/31/2020    ALT 17 05/31/2020       Current Inpatient Medications    Current Facility-Administered Medications: dextrose 5 % and 0.45 % NaCl with KCl 40 mEq infusion, , Intravenous, Continuous  hydrALAZINE (APRESOLINE) injection 10 mg, 10 mg, Intravenous, Q4H PRN  miconazole nitrate 2 % ointment, , Topical, BID **AND** miconazole nitrate 2 % ointment, , Topical, TID PRN  mineral oil-hydrophilic petrolatum (HYDROPHOR) ointment, , Topical, TID PRN  metoprolol (LOPRESSOR) injection 2.5 mg, 2.5 mg, Intravenous, Q6H  HYDROmorphone (DILAUDID) injection 0.5 mg, 0.5 mg, Intravenous, Q4H PRN  [Held by provider] amLODIPine (NORVASC) tablet 5 mg, 5 mg, Oral, Daily  [Held by provider] carvedilol (COREG) tablet 6.25 mg, 6.25 mg, Oral, Daily  [Held by provider] cilostazol (PLETAL) tablet 100 mg, 100 mg, Oral, BID  [Held by provider] mirtazapine (REMERON) tablet 15 mg, 15 mg, Oral, Nightly  [Held by provider] pantoprazole (PROTONIX) tablet 40 mg, 40 mg, Oral, Daily  [Held by provider] pravastatin (PRAVACHOL) tablet 10 mg, 10 mg, Oral, Daily  [Held by provider] docusate sodium (COLACE) capsule 100 mg, 100 mg, Oral, Nightly  insulin lispro (HUMALOG) injection vial 0-10 Units, 0-10 Units, Subcutaneous, 4x Daily AC & HS  glucose (GLUTOSE) 40 % oral gel 15 g, 15 g, Oral, PRN  dextrose 50 % IV solution, 12.5 g, Intravenous, PRN  glucagon (rDNA) injection 1 mg, 1 mg, Intramuscular, PRN  dextrose 5 % solution, 100 mL/hr, Intravenous, PRN  ondansetron (ZOFRAN) injection 4 mg, 4 mg, Intravenous, Q6H PRN  acetaminophen (TYLENOL) tablet 650 mg, 650 mg, Oral, Q4H PRN  heparin (porcine) injection 5,000 Units, 5,000 Units, Subcutaneous, Q8H  LORazepam (ATIVAN) injection 0.5 mg, 0.5 mg, Intravenous, Once    ASSESSMENT AND PLAN hypernatremia addressed  Calorie count to be initiated  Volume depletion corrected  Prerenal azotemia addressed effectively  Hypoalbuminemia noted long term issue evaluate

## 2020-06-01 PROBLEM — I95.9 HYPOTENSION: Status: RESOLVED | Noted: 2020-01-01 | Resolved: 2020-01-01

## 2020-06-01 PROBLEM — R41.82 CHANGE IN MENTAL STATUS: Status: RESOLVED | Noted: 2020-01-01 | Resolved: 2020-01-01

## 2020-06-01 PROBLEM — K56.7 ILEUS (HCC): Status: RESOLVED | Noted: 2020-01-01 | Resolved: 2020-01-01

## 2020-06-01 PROBLEM — R77.8 ELEVATED TROPONIN: Status: RESOLVED | Noted: 2020-01-01 | Resolved: 2020-01-01

## 2020-06-01 NOTE — CARE COORDINATION
6/1 Care Coordination: Plan for discharge is to [facility]. precert is approved. Await for discharge order. CM/SW will continue to follow for discharge planning. Warren HOPPER,RN-BC  [phone]    6/1 Plan for discharge today at 2030. [facility] ([phone]) will transport to [facility].   Warren HOPPER,RN-BC

## 2020-06-01 NOTE — PROGRESS NOTES
Patient's 190/60 BP 43 HR (manual), dr moran is aware. Patient's lopressor was held and prn hydralazine given. No further orders at this time. Dr moran does not wish to have a bmp drawn at this time.

## 2020-06-01 NOTE — PROGRESS NOTES
Standing: ModA +2 Sitting EOB:  Supervision  Dynamic Standing:  Min A with AAD      Pt is A & O x 3  Sensation:  Pt denies numbness and tingling to extremities  Edema:  unremarkable      Patient education  Pt educated on role of PT    Patient response to education:   Pt verbalized understanding Pt demonstrated skill Pt requires further education in this area   x x x     ASSESSMENT:    Comments:  Pt cleared by nurse prior to tx. Pt found in bed and initially refused tx but with encouragement pt agreed to tx. Pt required MaxA for bed mobility. Pt sat EOB for over 10 minutes ModA. Nurse in room during tx session to check pt's vitals. Pt's nurse took pt's BP while pt supine sitting and then again while therapy stood pt. Pt unable to advance B LEs to amb. Sitting EOB pt leans posterior and able to correct for short periods at a time. Pt returned to bed and repositioned with TAPs. Pt given call light. Treatment:  Patient practiced and was instructed in the following treatment:     Bed mobility - verbal cues for technique and assistance required.  Neuromuscular reeducation- verbal cues for trunk flexion to correct posterior lean and to utilize B UEs for support. PLAN:    Patient is making good progress towards established goals. Will continue with current POC.       Time in  8:39  Time out 9:03    Total Treatment Time  24 minutes     CPT codes:  [] Gait training 57590 0 minutes  [] Manual therapy 93278 0 minutes  [x] Therapeutic activities 72572 24 minutes  [] Therapeutic exercises 09462 0 minutes  [] Neuromuscular reeducation 76921 0 minutes    Darci Topinabee [id number]

## 2020-06-01 NOTE — PROGRESS NOTES
Department of General Surgery - Adult  Surgical Service   Attending Progress Note      SUBJECTIVE:  Stable improved    OBJECTIVE  Chemistry within range improved    Physical    VITALS:  BP (!) 188/66 Comment: manual  Pulse 79   Temp 98.4 °F (36.9 °C) (Temporal)   Resp 18   Ht 5' 6\" (1.676 m)   Wt 148 lb 8 oz (67.4 kg)   SpO2 95%   BMI 23.97 kg/m²   INTAKE/OUTPUT:    Intake/Output Summary (Last 24 hours) at 2020 1144  Last data filed at 2020 0959  Gross per 24 hour   Intake 999 ml   Output 1150 ml   Net -151 ml     URINARY CATHETER OUTPUT ([place]):     TEMPERATURE:  Current - Temp: 98.4 °F (36.9 °C);  Max - Temp  Av.8 °F (36.6 °C)  Min: 97 °F (36.1 °C)  Max: 98.4 °F (36.9 °C)  RESPIRATIONS RANGE: Resp  Av.6  Min: 16  Max: 18  PULSE RANGE: Pulse  Av.7  Min: 42  Max: 79  BLOOD PRESSURE RANGE:  Systolic (21FVF), LUF:216 , Min:124 , ANK:939   ; Diastolic (47XMF), SWC:51, Min:42, Max:76    CONSTITUTIONAL:  fatigued, alert, cooperative, no apparent distress, appears stated age and normal weight  EYES:  lids and lashes normal, pupils equal, round and reactive to light, extra-ocular muscles intact and sclera clear  NECK:  supple, symmetrical, trachea midline, skin normal and no stridor  LUNGS:  no increased work of breathing, good air exchange, no retractions and clear to auscultation  CARDIOVASCULAR:  normal apical pulses, regularly irregular rhythm and normal S1 and S2  ABDOMEN:  normal bowel sounds, soft, non-distended, non-tender, voluntary guarding absent and no masses palpated  Data  CBC with Differential:    Lab Results   Component Value Date    WBC 9.5 2020    RBC 3.49 2020    HGB 10.0 2020    HCT 31.8 2020     2020    MCV 91.1 2020    MCH 28.7 2020    MCHC 31.4 2020    RDW 16.0 2020    SEGSPCT 80 2012    LYMPHOPCT 22.3 2020    MONOPCT 5.5 2020    BASOPCT 0.3 2020    MONOSABS 0.43 2020    LYMPHSABS 1.74 05/25/2020    EOSABS 0.12 05/25/2020    BASOSABS 0.02 05/25/2020     CMP:    Lab Results   Component Value Date     05/31/2020    K 3.8 05/31/2020     05/31/2020    CO2 21 05/31/2020    BUN 26 05/31/2020    CREATININE 1.4 05/31/2020    GFRAA 57 05/31/2020    LABGLOM 47 05/31/2020    GLUCOSE 128 05/31/2020    GLUCOSE 133 07/19/2011    PROT 6.0 05/31/2020    LABALBU 2.3 05/31/2020    LABALBU 4.4 07/19/2011    CALCIUM 8.8 05/31/2020    BILITOT 0.3 05/31/2020    ALKPHOS 71 05/31/2020    AST 22 05/31/2020    ALT 17 05/31/2020       Current Inpatient Medications    Current Facility-Administered Medications: dextrose 5 % and 0.45 % NaCl with KCl 40 mEq infusion, , Intravenous, Continuous  amLODIPine (NORVASC) tablet 10 mg, 10 mg, Oral, Daily  hydrALAZINE (APRESOLINE) injection 10 mg, 10 mg, Intravenous, Q4H PRN  miconazole nitrate 2 % ointment, , Topical, BID **AND** miconazole nitrate 2 % ointment, , Topical, TID PRN  mineral oil-hydrophilic petrolatum (HYDROPHOR) ointment, , Topical, TID PRN  HYDROmorphone (DILAUDID) injection 0.5 mg, 0.5 mg, Intravenous, Q4H PRN  [Held by provider] carvedilol (COREG) tablet 6.25 mg, 6.25 mg, Oral, Daily  cilostazol (PLETAL) tablet 100 mg, 100 mg, Oral, BID  mirtazapine (REMERON) tablet 15 mg, 15 mg, Oral, Nightly  pantoprazole (PROTONIX) tablet 40 mg, 40 mg, Oral, Daily  pravastatin (PRAVACHOL) tablet 10 mg, 10 mg, Oral, Daily  docusate sodium (COLACE) capsule 100 mg, 100 mg, Oral, Nightly  insulin lispro (HUMALOG) injection vial 0-10 Units, 0-10 Units, Subcutaneous, 4x Daily AC & HS  glucose (GLUTOSE) 40 % oral gel 15 g, 15 g, Oral, PRN  dextrose 50 % IV solution, 12.5 g, Intravenous, PRN  glucagon (rDNA) injection 1 mg, 1 mg, Intramuscular, PRN  dextrose 5 % solution, 100 mL/hr, Intravenous, PRN  ondansetron (ZOFRAN) injection 4 mg, 4 mg, Intravenous, Q6H PRN  acetaminophen (TYLENOL) tablet 650 mg, 650 mg, Oral, Q4H PRN  heparin (porcine) injection 5,000 Units, 5,000

## 2020-06-01 NOTE — PROGRESS NOTES
Message to Dr. Maribel Luong regarding patient's continued hypertension. Patient positive for orthostatic blood pressures. Per case management, patient precert for facility received. Awaiting further orders.

## 2020-06-01 NOTE — PROGRESS NOTES
Occupational Therapy  OT BEDSIDE TREATMENT NOTE      Date:2020  Patient Name: Jose Diaz  MRN: [id number]  : 1927  Room: [address] [place] [address]     Referring Provider: Zeferino Gan DO     Evaluating OT: Desiree Grover OTR/L #[id number]     AM-PAC Daily Activity Raw Score: 10/24     Recommended Adaptive Equipment: To be further assessed       Diagnosis: change in mental status         Pertinent Medical History: CAD, depression, HTN, non-pressure chronic ulcers BLE     Precautions:  Falls, bed alarm, BLE wounds, cognition     Home Living: Pt lives in an [address] [place] [address] Road owned: w/c,   Prior Level of Function: Questionable historian, Pt stated he is mostly indep  with ADLs , assist with IADLs; using w/c for mobility . Driving: no     Pain Level: Pt did not complain of pain this session    Cognition: A&O: 3/4; Follows 1 step directions, inconsistently, requires repitition               Memory:  fair               Sequencing:  poor              Problem solving:  poor              Judgement/safety:  poor                Functional Assessment:    Initial Eval Status  Date: 20 Treatment Status  Date: 20 STG/LTG  Frequency/duration  2-3x/week, 5-7 days   Feeding Moderate Assist   To set up and position containters to avoid spillage of food DNT  modA per previous level Supervision    Grooming Maximal Assist   To put dentures in his mouth. Poor problem solving and  Mod A  Pt requiring assistance to wash face, comb hair,apply deodorant seated EOB Stand by Assist    UB Dressing Maximal Assist  maxA  [facility] gown Stand by Assist    LB Dressing Dependent  Dependent  [facility] socks  Moderate Assist    Bathing Maximal Assist Max A  simulated  Minimal Assist    Toileting Dependent  Dependent  Catheter and FMS system  Minimal Assist    Bed Mobility  Supine to sit: Moderate Assist   Sit to supine:  Moderate Assist  maxA  Supine<>EOB  EOB<>Supine    Supine to sit: Supervision   Sit to supine:

## 2020-06-01 NOTE — DISCHARGE SUMMARY
acute pathology. Patient most likely with metabolic encephalopathy. 2. Hypotension-patient noted to have low blood pressure on admission. Patient's blood pressure medications held. Toward the end of hospitalization, patient noted to have significantly elevated blood pressure. Patient's home blood pressure medications to be resumed on discharge. Amlodipine added to patient's blood pressure regimen at 10 mg daily. 3. Ileus-during hospitalization, patient developed nausea and vomiting. General surgery consulted and patient treated conservatively. Patient noted to tolerated diet on 5/29/2020.  4. Acute on chronic kidney disease- patient noted to have a baseline creatinine around 1.7. Patient with increased creatinine of 2.3 on 5/29/2020. Patient given IV fluid hydration. Creatinine currently trending down. Patient discharged with a creatinine of 1.4. This appears to be at his baseline. 5. Bradycardia-patient noted to have low heart rate. Patient is typically on Coreg. This medication has been on hold since 5/28/2020. Patient's Coreg discontinued on discharge. 6. Elevated troponin-patient noted to have slightly elevated troponin. This may be secondary to demand ischemia. Cardiology consulted and stated no further cardiac work-up needed. 7. Essential hypertension-patient with history of hypertension. Patient with low blood pressure during initial part of hospitalization. Patient's blood pressures have been on the higher side and Norvasc started on 5/31/2020. Patient discharged with hydrochlorothiazide, lisinopril and Norvasc. Coreg discontinued due to bradycardia in the low 40s. 8. Depression- patient noted to have underlying depression. Patient Remeron currently on hold. Consults:   IP CONSULT TO INTERNAL MEDICINE  IP CONSULT TO NEUROLOGY  IP CONSULT TO CARDIOLOGY  IP CONSULT TO GENERAL SURGERY    Discharge Diagnoses:   Active Problems:    Change in mental status    Hypotension    Ileus

## 2020-06-01 NOTE — PLAN OF CARE
Problem: Falls - Risk of:  Goal: Will remain free from falls  Description: Will remain free from falls  Outcome: Met This Shift  Goal: Absence of physical injury  Description: Absence of physical injury  Outcome: Met This Shift     Problem: Daily Care:  Goal: Daily care needs are met  Description: Daily care needs are met  Outcome: Met This Shift     Problem: Skin Integrity:  Goal: Will show no infection signs and symptoms  Description: Will show no infection signs and symptoms  Outcome: Met This Shift  Goal: Absence of new skin breakdown  Description: Absence of new skin breakdown  Outcome: Met This Shift     Problem: Serum Glucose Level - Abnormal:  Goal: Ability to maintain appropriate glucose levels will improve  Description: Ability to maintain appropriate glucose levels will improve  Outcome: Met This Shift